# Patient Record
Sex: MALE | Employment: FULL TIME | URBAN - METROPOLITAN AREA
[De-identification: names, ages, dates, MRNs, and addresses within clinical notes are randomized per-mention and may not be internally consistent; named-entity substitution may affect disease eponyms.]

---

## 2017-05-18 ENCOUNTER — HOSPITAL ENCOUNTER (OUTPATIENT)
Dept: PREADMISSION TESTING | Age: 52
Discharge: HOME | End: 2017-05-18

## 2017-05-18 VITALS
HEIGHT: 69 IN | OXYGEN SATURATION: 98 % | WEIGHT: 180 LBS | SYSTOLIC BLOOD PRESSURE: 150 MMHG | TEMPERATURE: 98.3 F | DIASTOLIC BLOOD PRESSURE: 79 MMHG | BODY MASS INDEX: 26.66 KG/M2 | RESPIRATION RATE: 20 BRPM | HEART RATE: 80 BPM

## 2017-05-18 RX ORDER — OMEPRAZOLE 20 MG/1
20 CAPSULE, DELAYED RELEASE ORAL AS NEEDED
COMMUNITY

## 2017-05-18 NOTE — H&P
PAT Pre-Op History & Physical    Patient: Lashon Quintero                  MRN: [id number]          SSN: [id number]  YOB: 1965          Age: 49 y.o. Sex: male                Subjective:   Patient is a 49 y.o.  male who presents with history of chronic left knee pain. tory of left knee arthroscopy twice in past about 10 years ago- second scope turned into open surgery per patient report. States that left knee pain returned about 1 year ago. Rates pain 5/10 and states it is an intermittent dull ache. Also c/o intermittent swelling and stiffness in left knee. States pain and swelling are exacerbated by prolonged walking- his job entails large amount of walking, climbing stairs, etc. Uses compression when pain and swelling become problematic. Does not like to take medications so has not taken NSAIDs. The patient was evaluated in the surgeon's office and it was determined that the most appropriate plan of care is to proceed with surgical intervention. Patient's PCP Dana Mcdaniel MD      Past Medical History:   Diagnosis Date    GERD (gastroesophageal reflux disease)     History of shingles       Past Surgical History:   Procedure Laterality Date    ABDOMEN SURGERY PROC UNLISTED Right     [facility] age 23   [facility] KNEE ARTHROSCOPY Left 2000,2007    2nd scope became open surgery    HX KNEE ARTHROSCOPY Right     age 13    HX ORTHOPAEDIC  06/2015    ACDF C6-7 after a fall    HX VASECTOMY        Prior to Admission medications    Medication Sig Start Date End Date Taking? Authorizing Provider   omeprazole (PRILOSEC) 20 mg capsule Take 20 mg by mouth as needed. Yes Historical Provider   CALCIUM CARBONATE (TUMS PO) Take  by mouth daily as needed. Yes Historical Provider     Current Outpatient Prescriptions   Medication Sig    omeprazole (PRILOSEC) 20 mg capsule Take 20 mg by mouth as needed.  CALCIUM CARBONATE (TUMS PO) Take  by mouth daily as needed.      No current facility-administered medications for this encounter. No Known Allergies   Social History   Substance Use Topics    Smoking status: Former Smoker     Packs/day: 0.50     Years: 23.00     Quit date: 2016    Smokeless tobacco: Never Used    Alcohol use No      History   Drug Use No     Family History   Problem Relation Age of Onset    Other Mother      AAA rupture    Hypertension Mother     Cancer Father      pancreatic, prostate    Alcohol abuse Sister     Thyroid Disease Sister          Review of Systems    Patient denies difficulty swallowing, mouth sores, or loose teeth. Patient denies any recent dental procedures or any planned prior to surgery. Patient denies chest pain, tightness, pain radiating down left arm, palpitations. Denies dizziness, visual disturbances, or lightheadedness. Patient denies shortness of breath, wheezing, cough, fever, or chills. Patient denies diarrhea, constipation, or abdominal pain. Patient denies urinary problems including dysuria, hesitancy, urgency, or incontinence. Denies skin breakdown, rashes, insect bites or open area. Objective:   Patient Vitals for the past 24 hrs:   Temp Pulse Resp BP SpO2   17 1441 98.3 °F (36.8 °C) 80 20 150/79 98 %     Temp (24hrs), Av.3 °F (36.8 °C), Min:98.3 °F (36.8 °C), Max:98.3 °F (36.8 °C)    Body mass index is 26.58 kg/(m^2). Wt Readings from Last 1 Encounters:   17 81.6 kg (180 lb)        Physical Exam:     General: Pleasant,  cooperative, no apparent distress, appears stated age. Eyes: Conjunctivae/corneas clear. EOMs intact. Nose: Nares normal.   Mouth/Throat: Lips, mucosa, and tongue normal. Teeth and gums normal.   Neck: Supple, symmetrical, trachea midline. Back: Symmetric   Lungs: Clear to auscultation bilaterally. Heart: Regular rate and rhythm, S1, S2 normal. No murmur, click, rub or gallop. Abdomen: Soft, non-tender. Bowel sounds normal. No distention.    Musculoskeletal: Unremarkable. Extremities:  Extremities normal, atraumatic, no cyanosis or edema. Calves                                 supple, non tender to palpation. Pulses: 2+ and symmetric bilateral upper extremities. Cap. refill <2 seconds   Skin: Skin color, texture, turgor normal.  No rashes or lesions. Neurologic: CN II-XII grossly intact. Alert and oriented x3. Assessment:     Medial meniscus tear. Plan:     Scheduled for left arthroscopic knee surgery/ partial medial menisectomy. No labs or other pre operative testing indicated by patient history.     Agustin Casas, NP

## 2017-05-18 NOTE — PERIOP NOTES
[facility]  PREOPERATIVE INSTRUCTIONS    Surgery Date:   5/22/17  Surgery arrival time given by surgeon: NO   If no,[facility] [address] staff will call you between 4 PM- 8 PM the day before surgery with your arrival time. If your surgery is on a Monday, we will call you the preceding Friday. Please call [phone] after 8 PM if you did not receive your arrival time. 1. Please report at the designated time to the [address] [facility]. Bring your insurance card, photo identification, and any copayment ( if applicable). 2. You must have a responsible adult to drive you home. You need to have a responsible adult to stay with you the first 24 hours after surgery if you are going home the same day of your surgery and you should not drive a car for 24 hours following your surgery. 3. Nothing to eat or drink after midnight the night before surgery. This includes no water, gum, mints, coffee, juice, etc.  Please note special instructions, if applicable, below for medications. 4. MEDICATIONS TO TAKE THE MORNING OF SURGERY WITH A SIP OF WATER: Prilosec   5. No alcoholic beverages 24 hours before or after your surgery. 6. If you are being admitted to the hospital,please leave personal belongings/luggage in your car until you have an assigned hospital room number. 7. Stop Aspirin and/or any non-steroidal anti-inflammatory drugs (i.e. Ibuprofen, Naproxen, Advil, Aleve) as directed by your surgeon. You may take Tylenol. Stop herbal supplements 1 week prior to  surgery. 8. If you are currently taking Plavix, Coumadin,or any other blood-thinning/anticoagulant medication contact your surgeon for instructions. 9. Please wear comfortable clothes. Wear your glasses instead of contacts. We ask that all money, jewelry and valuables be left at home. Wear no make up, particularly mascara, the day of surgery. 10.  All body piercings, rings,and jewelry need to be removed and left at home. Please wear your hair loose or down. Please no pony-tails, buns, or any metal hair accessories. If you shower the morning of surgery, please do not apply any lotions, powders, or deodorants afterwards. Do not shave any body area within 24 hours of your surgery. 11. Please follow all instructions to avoid any potential surgical cancellation. 12.  Should your physical condition change, (i.e. fever, cold, flu, etc.) please notify your surgeon as soon as possible. 13. It is important to be on time. If a situation occurs where you may be delayed, please call:  [phone] /  on the day of surgery. 14. The [facility] staff can be reached at [phone]. .  15. Special instructions: free  parking    The patient was contacted  in person. He  verbalize  understanding of all instructions does not  need reinforcement.

## 2017-05-19 ENCOUNTER — ANESTHESIA EVENT (OUTPATIENT)
Dept: SURGERY | Age: 52
End: 2017-05-19
Payer: COMMERCIAL

## 2017-05-21 NOTE — DISCHARGE INSTRUCTIONS
Lower Extremity Surgery  Discharge Instructions      Michael Pabon M.D. Please take the time to review the following instructions before you leave the hospital and use them as guidelines during your recovery from surgery. If you have any questions you may contact my office at [phone]. Wound Care / Dressing Changes: You may change your dressing as needed. Beginning two days after you are discharged from the hospital you should change your dressing daily. A big, bulky dressing isn't necessary as long as there is no drainage from the incisions. You can put a band-aid or a piece ofgauze over each incision and wear an ACE bandage as needed for comfort and swelling. Do not to apply antibiotic ointment to your incisions. Sutures will be removed at your one week post-op visit. Wolm Lias / Bathing: You may only shower. You may shower if there is no drainage from your incisions. Your dressing may be removed for showering. You may get your incisions wet in the shower. Don't vigorously scrub the area where your incisions are. Apply a clean, dry dressing after drying off the area of your incisions. Don't take a tub bath, get in a swimming pool or Jacuzzi until the incisions are completely healed. Do not soak your incisions under water. Weight Bearing Status / Braces:   Weight bear as tolerated. Please normalize your activities as quickly as possible. You are able to bend your knee as much as tolerated. We recommend beginning to ride a stationary bike 2 days after surgery. Ice / Elevation:   Continue ice and elevation consistently for 48 hours after surgery. On the 3rd day after surgery, you should ice your knee 3 times per day, for 20 minutes at a time. After one week from surgery, you may use ice and elevation as needed for pain and swelling. Please make sure there is a protective barrier between your skin and the ice.               Diet:   You may advance to your regular diet as tolerated. Increase your clear liquid intake for the next 2 to 3 days. Medication:   1. You will be given a prescriptions for pain medication when you are discharged from the hospital. Please use the medication as prescribed. Pain medications may cause constipation. Please take over the counter stool softeners while you are taking narcotic pain medication. Stool softeners, warm prune juice, and increasing your fluid and fiber intake can help prevent constipation. Do not take laxatives. Other possible side effects of pain medications are dizziness, headache, nausea, vomiting, and urinary retention. Discontinue the pain medication if you develop itching, rash, shortness of breath, or difficulties swallowing. If these symptoms become severe or arent relieved by discontinuing the medication, you should seek immediate medical attention. Refills of pain medication are authorized during office hours only   (8AM - 5PM Monday through Friday)    2. If you were prescribed Percocet/Oxycodone, Norco/Lortab/Vicodin/Hydrocodone or Dilaudid/Hydromorphone you must have a written prescription. These medications cannot be leagally called into the pharmacy. 3. Do not take Tylenol/Acetaminophen in addition to your pain medication, as most pain medications already contain this. Do not exceed 3000mg of Tylenol/Acetaminophen per day. 4. You will also take an antibiotic after surgery as prescribed. 5. You will take an anti-coagulant to prevent a blood clot. You should take Aspirin 325 mg twice/day with meals for 14 days after surgery. If you were prescribed Xarelto 10mg you should take it as prescribed. Do not take Aspirin with Xarelto. 6. You may resume the medications you were taking prior to your surgery. Pain medication may change the effects of any antidepressant medication you may be taking.  If you have any questions about possible interactions between your regular medication and the pain medication, you should consult the physician who prescribes your regular medications. Follow Up Appointment:   Please call [phone] for a follow appointment with Dr. Bia Barrera 7 - 10 days from the time of your surgery. Please let our  know you are scheduling a post-op appointment. Your appointment will likely be with Darby Little PA-C. Franki Pritchett assists Dr. Bia Barrera in surgery and will be able to review your operation and answer your questions. Physical Therapy:   Physical therapy will be discussed with you at your first follow-up appointment with Dr. Bia Barrera. You don't need to begin physical therapy prior to that visit. Signs and Symptoms to be Aware of: If any of the following signs and symptoms occur, you should contact Dr. Bia Barrera' office. Please be advised if a problem arises which you feel requires immediate medical attention or you are unable to contact Dr. Bia Barrera' office you should seek immediate medical attention at the emergency department or other health care facility you have access to.      Signs and symptoms to watch for include:     · A sudden increase in swelling andlor redness or warmth at the area your surgery was performed which isn't relieved by rest, ice and elevation    · Oral temperature greater than 101 degrees for 12 hours or more which isn't relieved by an increase in fluid intake and taking  Tylenol     · Excessive drainage from your incisions, or drainage which hasn't stopped by 72 hours after your surgery despite applying a compressive dressing, ice and elevation     · Calf pain, tenderness, redness or swelling which isn't relieved with rest and elevation     · Fever, chills, shortness ofbreath, chest pain, nausea, vomiting or other signs and symptoms which are of concern to you    Other Instructions:    DISCHARGE SUMMARY from your Nurse    The following personal items collected during your admission are returned to you:   Dental Appliance: Dental Appliances: None  Vision: Visual Aid: None  Hearing Aid:    Jewelry: Jewelry: None  Clothing: Clothing: Footwear, Pants, Shirt, Socks, Undergarments (locker)  Other Valuables: Other Valuables: None  Valuables sent to safe:      PATIENT INSTRUCTIONS:    After general anesthesia or intravenous sedation, for 24 hours or while taking prescription Narcotics:  · Limit your activities  · Do not drive and operate hazardous machinery  · Do not make important personal or business decisions  · Do  not drink alcoholic beverages  · If you have not urinated within 8 hours after discharge, please contact your surgeon on call. Report the following to your surgeon:  · Excessive pain, swelling, redness or odor of or around the surgical area  · Temperature over 100.5  · Nausea and vomiting lasting longer than 4 hours or if unable to take medications  · Any signs of decreased circulation or nerve impairment to extremity: change in color, persistent  numbness, tingling, coldness or increase pain  · Any questions    COUGH AND DEEP BREATHE    Breathing deep and coughing are very important exercises to do after surgery. Deep breathing and coughing open the little air tubes and air sacks in your lungs. You take deep breaths every day. You may not even notice - it is just something you do when you sigh or yawn. It is a natural exercise you do to keep these air passages open. After surgery, take deep breaths and cough, on purpose. Coughing and deep breathing help prevent bronchitis and pneumonia after surgery. If you had chest or belly surgery, use a pillow as a \"hug buddy\" and hold it tightly to your chest or belly when you cough. DIRECTIONS:  6. Take 10 to 15 slow deep breaths every hour while awake. 7. Breathe in deeply, and hold it for 2 seconds. 8. Exhale slowly through puckered lips, like blowing up a balloon.   9. After every 4th or 5th deep breath, hug your pillow to your chest or belly and give a hard, deep cough. Yes, it will probably hurt. But doing this exercise is very important part of healing after surgery. Take your pain medicine to help you do this exercise without too much pain. IF YOU HAVE BEEN DIAGNOSED WITH SLEEP APNEA, PLEASE USE YOUR SLEEIFP APNEA DEVICE OR CPAP MACHINE WHEN YOU INTEND TO NAP AFTER TAKING PAIN MEDICATION. Ankle Pumps    Ankle pumps increase the circulation of oxygenated blood to your lower extremities and decrease your risk for circulation problems such as blood clots. They also stretch the muscles, tendons and ligaments in your foot and ankle, and prevent joint contracture in the ankle and foot, especially after surgeries on the legs. It is important to do ankle pump exercises regularly after surgery because immobility increases your risk for developing a blood clot. Your doctor may also have you take an Aspirin for the next few days as well. If your doctor did not ask you to take an Aspirin, consult with him before starting Aspirin therapy on your own. Slowly point your foot forward, feeling the muscles on the top of your lower leg stretch, and hold this position for 5 seconds. Next, pull your foot back toward you as far as possible, stretching the calf muscles, and hold that position for 5 seconds. Repeat with the other foot. Perform 10 repetitions every hour while awake for both ankles if possible (down and then up with the foot once is one repetition). You should feel gentle stretching of the muscles in your lower leg when doing this exercise. If you feel pain, or your range of motion is limited, don't  Push too hard. Only go the limit your joint and muscles will let you go. If you have increasing pain, progressively worsening leg warmth or swelling, STOP the exercise and call your doctor.      Below is information about the medications your doctor is prescribing after your visit:    Other information in your discharge envelope:  []     PRESCRIPTIONS  []     PHYSICAL THERAPY PRESCRIPTION  []     APPOINTMENT CARDS  []     Regional Anesthesia Pamphlet for block or block with On-Q Catheter from Anesthesia Service  []     Medical device information sheets/pamphlets from their    []     School/work excuse note. []     /parent work excuse note. These are general instructions for a healthy lifestyle:    *  Please give a list of your current medications to your Primary Care Provider. *  Please update this list whenever your medications are discontinued, doses are      changed, or new medications (including over-the-counter products) are added. *  Please carry medication information at all times in case of emergency situations. About Smoking  No smoking / No tobacco products / Avoid exposure to second hand smoke    Surgeon General's Warning:  Quitting smoking now greatly reduces serious risk to your health. Obesity, smoking, and sedentary lifestyle greatly increases your risk for illness and disease. A healthy diet, regular physical exercise & weight monitoring are important for maintaining a healthy lifestyle. Congestive Heart Failure  You may be retaining fluid if you have a history of heart failure or if you experience any of the following symptoms:  Weight gain of 3 pounds or more overnight or 5 pounds in a week, increased swelling in our hands or feet or shortness of breath while lying flat in bed. Please call your doctor as soon as you notice any of these symptoms; do not wait until your next office visit. Recognize signs and symptoms of STROKE:  F - face looks uneven  A - arms unable to move or move even  S - speech slurred or non-existent  T - time-call 911 as soon as signs and symptoms begin-DO NOT go         Back to bed or wait to see if you get better-TIME IS BRAIN. Warning signs of HEART ATTACK  Call 911 if you have these symptoms    · Chest discomfort.   Most heart attacks involve discomfort in the center of the chest that lasts more than a few minutes, or that goes away and comes back. It can feel like uncomfortable pressure, squeezing, fullness, or pain. · Discomfort in other areas of the upper body. Symptoms can include pain or discomfort in one or both        Arms, the back, neck, jaw, or stomach. ·  Shortness of breath with or without chest discomfort. · Other signs may include breaking out in a cold sweat, nausea, or lightheadedness    Don't wait more than five minutes to call 911 - MINUTES MATTER! Fast action can save your life. Calling 911 is almost always the fastest way to get lifesaving treatment. Emergency Medical Services staff can begin treatment when they arrive - up to an hour sooner than if someone gets to the hospital by car. [facility] MEDICATION AND SIDE EFFECT GUIDE    The [facility] [place] MEDICATION AND SIDE EFFECT GUIDE was provided to the PATIENT AND CARE PROVIDER.   Information provided includes instruction about drug purpose and common side effects for the following medications:    · Norco

## 2017-05-22 ENCOUNTER — HOSPITAL ENCOUNTER (OUTPATIENT)
Age: 52
Setting detail: OUTPATIENT SURGERY
Discharge: HOME | End: 2017-05-22
Attending: ORTHOPAEDIC SURGERY | Admitting: ORTHOPAEDIC SURGERY
Payer: COMMERCIAL

## 2017-05-22 ENCOUNTER — ANESTHESIA (OUTPATIENT)
Dept: SURGERY | Age: 52
End: 2017-05-22
Payer: COMMERCIAL

## 2017-05-22 VITALS
HEART RATE: 65 BPM | RESPIRATION RATE: 20 BRPM | DIASTOLIC BLOOD PRESSURE: 68 MMHG | SYSTOLIC BLOOD PRESSURE: 111 MMHG | TEMPERATURE: 97.8 F | OXYGEN SATURATION: 97 %

## 2017-05-22 PROCEDURE — 77030000032 HC CUF TRNQT ZIMM -B: Performed by: ORTHOPAEDIC SURGERY

## 2017-05-22 PROCEDURE — 77030002916 HC SUT ETHLN J&J -A: Performed by: ORTHOPAEDIC SURGERY

## 2017-05-22 PROCEDURE — 76060000032 HC ANESTHESIA 0.5 TO 1 HR: Performed by: ORTHOPAEDIC SURGERY

## 2017-05-22 PROCEDURE — 74011250636 HC RX REV CODE- 250/636: Performed by: ANESTHESIOLOGY

## 2017-05-22 PROCEDURE — 74011250636 HC RX REV CODE- 250/636

## 2017-05-22 PROCEDURE — 74011250637 HC RX REV CODE- 250/637: Performed by: ORTHOPAEDIC SURGERY

## 2017-05-22 PROCEDURE — 77030006877 HC BLD SHV FLL RAD S&N -B: Performed by: ORTHOPAEDIC SURGERY

## 2017-05-22 PROCEDURE — 76010000138 HC OR TIME 0.5 TO 1 HR: Performed by: ORTHOPAEDIC SURGERY

## 2017-05-22 PROCEDURE — 77030008496 HC TBNG ARTHSC IRR S&N -B: Performed by: ORTHOPAEDIC SURGERY

## 2017-05-22 PROCEDURE — 76210000021 HC REC RM PH II 0.5 TO 1 HR: Performed by: ORTHOPAEDIC SURGERY

## 2017-05-22 PROCEDURE — 77030018835 HC SOL IRR LR ICUM -A: Performed by: ORTHOPAEDIC SURGERY

## 2017-05-22 PROCEDURE — 74011250636 HC RX REV CODE- 250/636: Performed by: ORTHOPAEDIC SURGERY

## 2017-05-22 PROCEDURE — 76210000006 HC OR PH I REC 0.5 TO 1 HR: Performed by: ORTHOPAEDIC SURGERY

## 2017-05-22 PROCEDURE — 77030020782 HC GWN BAIR PAWS FLX 3M -B

## 2017-05-22 PROCEDURE — 77030020143 HC AIRWY LARYN INTUB CGAS -A: Performed by: NURSE ANESTHETIST, CERTIFIED REGISTERED

## 2017-05-22 RX ORDER — SODIUM CHLORIDE 0.9 % (FLUSH) 0.9 %
5-10 SYRINGE (ML) INJECTION EVERY 8 HOURS
Status: DISCONTINUED | OUTPATIENT
Start: 2017-05-22 | End: 2017-05-22 | Stop reason: HOSPADM

## 2017-05-22 RX ORDER — SODIUM CHLORIDE 0.9 % (FLUSH) 0.9 %
5-10 SYRINGE (ML) INJECTION AS NEEDED
Status: DISCONTINUED | OUTPATIENT
Start: 2017-05-22 | End: 2017-05-22 | Stop reason: HOSPADM

## 2017-05-22 RX ORDER — HYDROCODONE BITARTRATE AND ACETAMINOPHEN 5; 325 MG/1; MG/1
1 TABLET ORAL
Status: COMPLETED | OUTPATIENT
Start: 2017-05-22 | End: 2017-05-22

## 2017-05-22 RX ORDER — ROPIVACAINE HYDROCHLORIDE 5 MG/ML
INJECTION, SOLUTION EPIDURAL; INFILTRATION; PERINEURAL AS NEEDED
Status: DISCONTINUED | OUTPATIENT
Start: 2017-05-22 | End: 2017-05-22 | Stop reason: HOSPADM

## 2017-05-22 RX ORDER — DIPHENHYDRAMINE HYDROCHLORIDE 50 MG/ML
12.5 INJECTION, SOLUTION INTRAMUSCULAR; INTRAVENOUS AS NEEDED
Status: DISCONTINUED | OUTPATIENT
Start: 2017-05-22 | End: 2017-05-22 | Stop reason: HOSPADM

## 2017-05-22 RX ORDER — ONDANSETRON 2 MG/ML
INJECTION INTRAMUSCULAR; INTRAVENOUS AS NEEDED
Status: DISCONTINUED | OUTPATIENT
Start: 2017-05-22 | End: 2017-05-22 | Stop reason: HOSPADM

## 2017-05-22 RX ORDER — CEFAZOLIN SODIUM IN 0.9 % NACL 2 G/50 ML
2 INTRAVENOUS SOLUTION, PIGGYBACK (ML) INTRAVENOUS ONCE
Status: COMPLETED | OUTPATIENT
Start: 2017-05-22 | End: 2017-05-22

## 2017-05-22 RX ORDER — KETOROLAC TROMETHAMINE 30 MG/ML
INJECTION, SOLUTION INTRAMUSCULAR; INTRAVENOUS AS NEEDED
Status: DISCONTINUED | OUTPATIENT
Start: 2017-05-22 | End: 2017-05-22 | Stop reason: HOSPADM

## 2017-05-22 RX ORDER — MIDAZOLAM HYDROCHLORIDE 1 MG/ML
INJECTION, SOLUTION INTRAMUSCULAR; INTRAVENOUS AS NEEDED
Status: DISCONTINUED | OUTPATIENT
Start: 2017-05-22 | End: 2017-05-22 | Stop reason: HOSPADM

## 2017-05-22 RX ORDER — LIDOCAINE HYDROCHLORIDE 10 MG/ML
0.1 INJECTION, SOLUTION EPIDURAL; INFILTRATION; INTRACAUDAL; PERINEURAL AS NEEDED
Status: DISCONTINUED | OUTPATIENT
Start: 2017-05-22 | End: 2017-05-22 | Stop reason: HOSPADM

## 2017-05-22 RX ORDER — HYDROCODONE BITARTRATE AND ACETAMINOPHEN 5; 325 MG/1; MG/1
1 TABLET ORAL
Qty: 40 TAB | Refills: 0 | Status: SHIPPED | OUTPATIENT
Start: 2017-05-22

## 2017-05-22 RX ORDER — SODIUM CHLORIDE, SODIUM LACTATE, POTASSIUM CHLORIDE, CALCIUM CHLORIDE 600; 310; 30; 20 MG/100ML; MG/100ML; MG/100ML; MG/100ML
100 INJECTION, SOLUTION INTRAVENOUS CONTINUOUS
Status: DISCONTINUED | OUTPATIENT
Start: 2017-05-22 | End: 2017-05-22 | Stop reason: HOSPADM

## 2017-05-22 RX ORDER — PROPOFOL 10 MG/ML
INJECTION, EMULSION INTRAVENOUS AS NEEDED
Status: DISCONTINUED | OUTPATIENT
Start: 2017-05-22 | End: 2017-05-22 | Stop reason: HOSPADM

## 2017-05-22 RX ORDER — ONDANSETRON 2 MG/ML
4 INJECTION INTRAMUSCULAR; INTRAVENOUS AS NEEDED
Status: DISCONTINUED | OUTPATIENT
Start: 2017-05-22 | End: 2017-05-22 | Stop reason: HOSPADM

## 2017-05-22 RX ORDER — FENTANYL CITRATE 50 UG/ML
INJECTION, SOLUTION INTRAMUSCULAR; INTRAVENOUS AS NEEDED
Status: DISCONTINUED | OUTPATIENT
Start: 2017-05-22 | End: 2017-05-22 | Stop reason: HOSPADM

## 2017-05-22 RX ORDER — DEXAMETHASONE SODIUM PHOSPHATE 4 MG/ML
INJECTION, SOLUTION INTRA-ARTICULAR; INTRALESIONAL; INTRAMUSCULAR; INTRAVENOUS; SOFT TISSUE AS NEEDED
Status: DISCONTINUED | OUTPATIENT
Start: 2017-05-22 | End: 2017-05-22 | Stop reason: HOSPADM

## 2017-05-22 RX ORDER — HYDROMORPHONE HYDROCHLORIDE 1 MG/ML
.25-1 INJECTION, SOLUTION INTRAMUSCULAR; INTRAVENOUS; SUBCUTANEOUS
Status: DISCONTINUED | OUTPATIENT
Start: 2017-05-22 | End: 2017-05-22 | Stop reason: HOSPADM

## 2017-05-22 RX ORDER — SODIUM CHLORIDE, SODIUM LACTATE, POTASSIUM CHLORIDE, CALCIUM CHLORIDE 600; 310; 30; 20 MG/100ML; MG/100ML; MG/100ML; MG/100ML
125 INJECTION, SOLUTION INTRAVENOUS CONTINUOUS
Status: DISCONTINUED | OUTPATIENT
Start: 2017-05-22 | End: 2017-05-22 | Stop reason: HOSPADM

## 2017-05-22 RX ADMIN — FENTANYL CITRATE 25 MCG: 50 INJECTION, SOLUTION INTRAMUSCULAR; INTRAVENOUS at 07:51

## 2017-05-22 RX ADMIN — FENTANYL CITRATE 25 MCG: 50 INJECTION, SOLUTION INTRAMUSCULAR; INTRAVENOUS at 07:52

## 2017-05-22 RX ADMIN — CEFAZOLIN 2 G: 1 INJECTION, POWDER, FOR SOLUTION INTRAMUSCULAR; INTRAVENOUS; PARENTERAL at 07:31

## 2017-05-22 RX ADMIN — MIDAZOLAM HYDROCHLORIDE 2 MG: 1 INJECTION, SOLUTION INTRAMUSCULAR; INTRAVENOUS at 07:29

## 2017-05-22 RX ADMIN — ONDANSETRON 4 MG: 2 INJECTION INTRAMUSCULAR; INTRAVENOUS at 08:03

## 2017-05-22 RX ADMIN — FENTANYL CITRATE 25 MCG: 50 INJECTION, SOLUTION INTRAMUSCULAR; INTRAVENOUS at 07:29

## 2017-05-22 RX ADMIN — SODIUM CHLORIDE, SODIUM LACTATE, POTASSIUM CHLORIDE, AND CALCIUM CHLORIDE 100 ML/HR: 600; 310; 30; 20 INJECTION, SOLUTION INTRAVENOUS at 06:41

## 2017-05-22 RX ADMIN — HYDROCODONE BITARTRATE AND ACETAMINOPHEN 1 TABLET: 5; 325 TABLET ORAL at 09:16

## 2017-05-22 RX ADMIN — DEXAMETHASONE SODIUM PHOSPHATE 4 MG: 4 INJECTION, SOLUTION INTRA-ARTICULAR; INTRALESIONAL; INTRAMUSCULAR; INTRAVENOUS; SOFT TISSUE at 07:20

## 2017-05-22 RX ADMIN — FENTANYL CITRATE 25 MCG: 50 INJECTION, SOLUTION INTRAMUSCULAR; INTRAVENOUS at 07:38

## 2017-05-22 RX ADMIN — SODIUM CHLORIDE, SODIUM LACTATE, POTASSIUM CHLORIDE, AND CALCIUM CHLORIDE: 600; 310; 30; 20 INJECTION, SOLUTION INTRAVENOUS at 08:11

## 2017-05-22 RX ADMIN — KETOROLAC TROMETHAMINE 30 MG: 30 INJECTION, SOLUTION INTRAMUSCULAR; INTRAVENOUS at 08:03

## 2017-05-22 RX ADMIN — PROPOFOL 200 MG: 10 INJECTION, EMULSION INTRAVENOUS at 07:35

## 2017-05-22 NOTE — H&P (VIEW-ONLY)
PAT Pre-Op History & Physical    Patient: Alda Monreal                  MRN: [id number]          SSN: [id number]  YOB: 1965          Age: 49 y.o. Sex: male                Subjective:   Patient is a 49 y.o.  male who presents with history of chronic left knee pain. tory of left knee arthroscopy twice in past about 10 years ago- second scope turned into open surgery per patient report. States that left knee pain returned about 1 year ago. Rates pain 5/10 and states it is an intermittent dull ache. Also c/o intermittent swelling and stiffness in left knee. States pain and swelling are exacerbated by prolonged walking- his job entails large amount of walking, climbing stairs, etc. Uses compression when pain and swelling become problematic. Does not like to take medications so has not taken NSAIDs. The patient was evaluated in the surgeon's office and it was determined that the most appropriate plan of care is to proceed with surgical intervention. Patient's PCP Patsy Washington MD      Past Medical History:   Diagnosis Date    GERD (gastroesophageal reflux disease)     History of shingles       Past Surgical History:   Procedure Laterality Date    ABDOMEN SURGERY PROC UNLISTED Right     [facility] age 23   [facility] KNEE ARTHROSCOPY Left 2000,2007    2nd scope became open surgery    HX KNEE ARTHROSCOPY Right     age 13    HX ORTHOPAEDIC  06/2015    ACDF C6-7 after a fall    HX VASECTOMY        Prior to Admission medications    Medication Sig Start Date End Date Taking? Authorizing Provider   omeprazole (PRILOSEC) 20 mg capsule Take 20 mg by mouth as needed. Yes Historical Provider   CALCIUM CARBONATE (TUMS PO) Take  by mouth daily as needed. Yes Historical Provider     Current Outpatient Prescriptions   Medication Sig    omeprazole (PRILOSEC) 20 mg capsule Take 20 mg by mouth as needed.  CALCIUM CARBONATE (TUMS PO) Take  by mouth daily as needed.      No current facility-administered medications for this encounter. No Known Allergies   Social History   Substance Use Topics    Smoking status: Former Smoker     Packs/day: 0.50     Years: 23.00     Quit date: 2016    Smokeless tobacco: Never Used    Alcohol use No      History   Drug Use No     Family History   Problem Relation Age of Onset    Other Mother      AAA rupture    Hypertension Mother     Cancer Father      pancreatic, prostate    Alcohol abuse Sister     Thyroid Disease Sister          Review of Systems    Patient denies difficulty swallowing, mouth sores, or loose teeth. Patient denies any recent dental procedures or any planned prior to surgery. Patient denies chest pain, tightness, pain radiating down left arm, palpitations. Denies dizziness, visual disturbances, or lightheadedness. Patient denies shortness of breath, wheezing, cough, fever, or chills. Patient denies diarrhea, constipation, or abdominal pain. Patient denies urinary problems including dysuria, hesitancy, urgency, or incontinence. Denies skin breakdown, rashes, insect bites or open area. Objective:   Patient Vitals for the past 24 hrs:   Temp Pulse Resp BP SpO2   17 1441 98.3 °F (36.8 °C) 80 20 150/79 98 %     Temp (24hrs), Av.3 °F (36.8 °C), Min:98.3 °F (36.8 °C), Max:98.3 °F (36.8 °C)    Body mass index is 26.58 kg/(m^2). Wt Readings from Last 1 Encounters:   17 81.6 kg (180 lb)        Physical Exam:     General: Pleasant,  cooperative, no apparent distress, appears stated age. Eyes: Conjunctivae/corneas clear. EOMs intact. Nose: Nares normal.   Mouth/Throat: Lips, mucosa, and tongue normal. Teeth and gums normal.   Neck: Supple, symmetrical, trachea midline. Back: Symmetric   Lungs: Clear to auscultation bilaterally. Heart: Regular rate and rhythm, S1, S2 normal. No murmur, click, rub or gallop. Abdomen: Soft, non-tender. Bowel sounds normal. No distention.    Musculoskeletal: Unremarkable. Extremities:  Extremities normal, atraumatic, no cyanosis or edema. Calves                                 supple, non tender to palpation. Pulses: 2+ and symmetric bilateral upper extremities. Cap. refill <2 seconds   Skin: Skin color, texture, turgor normal.  No rashes or lesions. Neurologic: CN II-XII grossly intact. Alert and oriented x3. Assessment:     Medial meniscus tear. Plan:     Scheduled for left arthroscopic knee surgery/ partial medial menisectomy. No labs or other pre operative testing indicated by patient history.     Agustin Casas, NP

## 2017-05-22 NOTE — IP AVS SNAPSHOT
[address] 
[phone] Patient: Prudence Young MRN: [id number] DPC:0/7/2064 You are allergic to the following No active allergies Recent Documentation Smoking Status Former Smoker Emergency Contacts Name Discharge Info Relation Home Work Mobile [facility] CAREGIVER [3] Spouse [3]   [phone] About your hospitalization You were admitted on:  May 22, 2017 You last received care in the:  [facility] PACU You were discharged on:  May 22, 2017 Unit phone number:  [phone] Why you were hospitalized Your primary diagnosis was:  Not on File Providers Seen During Your Hospitalizations Provider Role Specialty Primary office phone Rody Strauss MD Attending Provider Orthopedic Surgery [phone] Your Primary Care Physician (PCP) Primary Care Physician Office Phone Office Fax Meri Estiven [phone] Follow-up Information Follow up With Details Comments Contact Info Carlito Angel MD   [address] 
[phone] Current Discharge Medication List  
  
START taking these medications Dose & Instructions Dispensing Information Comments Morning Noon Evening Bedtime HYDROcodone-acetaminophen 5-325 mg per tablet Commonly known as:  Jerl Ards Your last dose was: Your next dose is:    
   
   
 Dose:  1 Tab Take 1 Tab by mouth every four (4) hours as needed for Pain. Max Daily Amount: 6 Tabs. Quantity:  40 Tab Refills:  0 CONTINUE these medications which have NOT CHANGED Dose & Instructions Dispensing Information Comments Morning Noon Evening Bedtime PriLOSEC 20 mg capsule Generic drug:  omeprazole Your last dose was: Your next dose is: Dose:  20 mg Take 20 mg by mouth as needed. Refills:  0  
     
   
   
   
  
 TUMS PO Your last dose was: Your next dose is: Take  by mouth daily as needed. Refills:  0 Where to Get Your Medications Information on where to get these meds will be given to you by the nurse or doctor. ! Ask your nurse or doctor about these medications HYDROcodone-acetaminophen 5-325 mg per tablet Discharge Instructions Lower Extremity Surgery Discharge Instructions Theodore Nath M.D. Please take the time to review the following instructions before you leave the hospital and use them as guidelines during your recovery from surgery. If you have any questions you may contact my office at [phone]. Wound Care / Dressing Changes: You may change your dressing as needed. Beginning two days after you are discharged from the hospital you should change your dressing daily. A big, bulky dressing isn't necessary as long as there is no drainage from the incisions. You can put a band-aid or a piece ofgauze over each incision and wear an ACE bandage as needed for comfort and swelling. Do not to apply antibiotic ointment to your incisions. Sutures will be removed at your one week post-op visit. Wilfrid Rayo / Bathing: You may only shower. You may shower if there is no drainage from your incisions. Your dressing may be removed for showering. You may get your incisions wet in the shower. Don't vigorously scrub the area where your incisions are. Apply a clean, dry dressing after drying off the area of your incisions. Don't take a tub bath, get in a swimming pool or Jacuzzi until the incisions are completely healed. Do not soak your incisions under water. Weight Bearing Status / Braces:  
Weight bear as tolerated.   Please normalize your activities as quickly as possible. You are able to bend your knee as much as tolerated. We recommend beginning to ride a stationary bike 2 days after surgery. Ice / Elevation:  
Continue ice and elevation consistently for 48 hours after surgery. On the 3rd day after surgery, you should ice your knee 3 times per day, for 20 minutes at a time. After one week from surgery, you may use ice and elevation as needed for pain and swelling. Please make sure there is a protective barrier between your skin and the ice. Diet:  
You may advance to your regular diet as tolerated. Increase your clear liquid intake for the next 2 to 3 days. Medication:  
1. You will be given a prescriptions for pain medication when you are discharged from the hospital. Please use the medication as prescribed. Pain medications may cause constipation. Please take over the counter stool softeners while you are taking narcotic pain medication. Stool softeners, warm prune juice, and increasing your fluid and fiber intake can help prevent constipation. Do not take laxatives. Other possible side effects of pain medications are dizziness, headache, nausea, vomiting, and urinary retention. Discontinue the pain medication if you develop itching, rash, shortness of breath, or difficulties swallowing. If these symptoms become severe or arent relieved by discontinuing the medication, you should seek immediate medical attention. Refills of pain medication are authorized during office hours only (8AM  5PM Monday through Friday) 2. If you were prescribed Percocet/Oxycodone, Norco/Lortab/Vicodin/Hydrocodone or Dilaudid/Hydromorphone you must have a written prescription. These medications cannot be leagally called into the pharmacy. 3. Do not take Tylenol/Acetaminophen in addition to your pain medication, as most pain medications already contain this. Do not exceed 3000mg of Tylenol/Acetaminophen per day. 4. You will also take an antibiotic after surgery as prescribed. 5. You will take an anti-coagulant to prevent a blood clot. You should take Aspirin 325 mg twice/day with meals for 14 days after surgery. If you were prescribed Xarelto 10mg you should take it as prescribed. Do not take Aspirin with Xarelto. 6. You may resume the medications you were taking prior to your surgery. Pain medication may change the effects of any antidepressant medication you may be taking. If you have any questions about possible interactions between your regular medication and the pain medication, you should consult the physician who prescribes your regular medications. Follow Up Appointment:  
Please call [phone] for a follow appointment with Dr. La Miles 7 - 10 days from the time of your surgery. Please let our  know you are scheduling a post-op appointment. Your appointment will likely be with EDDIE Christensen assists Dr. La Miles in surgery and will be able to review your operation and answer your questions. Physical Therapy:  
Physical therapy will be discussed with you at your first follow-up appointment with Dr. La Miles. You don't need to begin physical therapy prior to that visit. Signs and Symptoms to be Aware of: If any of the following signs and symptoms occur, you should contact Dr. La Miles' office. Please be advised if a problem arises which you feel requires immediate medical attention or you are unable to contact Dr. La Miles' office you should seek immediate medical attention at the emergency department or other health care facility you have access to. Signs and symptoms to watch for include: · A sudden increase in swelling andlor redness or warmth at the area your surgery was performed which isn't relieved by rest, ice and elevation · Oral temperature greater than 101 degrees for 12 hours or more which isn't relieved by an increase in fluid intake and taking  Tylenol · Excessive drainage from your incisions, or drainage which hasn't stopped by 72 hours after your surgery despite applying a compressive dressing, ice and elevation · Calf pain, tenderness, redness or swelling which isn't relieved with rest and elevation · Fever, chills, shortness ofbreath, chest pain, nausea, vomiting or other signs and symptoms which are of concern to you Other Instructions: 
 
DISCHARGE SUMMARY from your Nurse The following personal items collected during your admission are returned to you:  
Dental Appliance: Dental Appliances: None Vision: Visual Aid: None Hearing Aid:   
Jewelry: Jewelry: None Clothing: Clothing: Footwear, Pants, Shirt, Socks, Undergarments (locker) Other Valuables: Other Valuables: None Valuables sent to safe:   
 
PATIENT INSTRUCTIONS: 
 
After general anesthesia or intravenous sedation, for 24 hours or while taking prescription Narcotics: · Limit your activities · Do not drive and operate hazardous machinery · Do not make important personal or business decisions · Do  not drink alcoholic beverages · If you have not urinated within 8 hours after discharge, please contact your surgeon on call. Report the following to your surgeon: 
· Excessive pain, swelling, redness or odor of or around the surgical area · Temperature over 100.5 · Nausea and vomiting lasting longer than 4 hours or if unable to take medications · Any signs of decreased circulation or nerve impairment to extremity: change in color, persistent  numbness, tingling, coldness or increase pain · Any questions [address] Breathing deep and coughing are very important exercises to do after surgery. Deep breathing and coughing open the little air tubes and air sacks in your lungs. You take deep breaths every day.   You may not even notice - it is just something you do when you sigh or yawn. It is a natural exercise you do to keep these air passages open. After surgery, take deep breaths and cough, on purpose. Coughing and deep breathing help prevent bronchitis and pneumonia after surgery. If you had chest or belly surgery, use a pillow as a \"hug tung\" and hold it tightly to your chest or belly when you cough. DIRECTIONS: 
6. Take 10 to 15 slow deep breaths every hour while awake. 7. Breathe in deeply, and hold it for 2 seconds. 8. Exhale slowly through puckered lips, like blowing up a balloon. 9. After every 4th or 5th deep breath, hug your pillow to your chest or belly and give a hard, deep cough. Yes, it will probably hurt. But doing this exercise is very important part of healing after surgery. Take your pain medicine to help you do this exercise without too much pain. IF YOU HAVE BEEN DIAGNOSED WITH SLEEP APNEA, PLEASE USE YOUR SLEEIFP APNEA DEVICE OR CPAP MACHINE WHEN YOU INTEND TO NAP AFTER TAKING PAIN MEDICATION. Ankle Pumps Ankle pumps increase the circulation of oxygenated blood to your lower extremities and decrease your risk for circulation problems such as blood clots. They also stretch the muscles, tendons and ligaments in your foot and ankle, and prevent joint contracture in the ankle and foot, especially after surgeries on the legs. It is important to do ankle pump exercises regularly after surgery because immobility increases your risk for developing a blood clot. Your doctor may also have you take an Aspirin for the next few days as well. If your doctor did not ask you to take an Aspirin, consult with him before starting Aspirin therapy on your own. Slowly point your foot forward, feeling the muscles on the top of your lower leg stretch, and hold this position for 5 seconds.   
 
          
 
Next, pull your foot back toward you as far as possible, stretching the calf muscles, and hold that position for 5 seconds. Repeat with the other foot. Perform 10 repetitions every hour while awake for both ankles if possible (down and then up with the foot once is one repetition). You should feel gentle stretching of the muscles in your lower leg when doing this exercise. If you feel pain, or your range of motion is limited, don't  Push too hard. Only go the limit your joint and muscles will let you go. If you have increasing pain, progressively worsening leg warmth or swelling, STOP the exercise and call your doctor. Below is information about the medications your doctor is prescribing after your visit: 
 
 
 
 
 
 
Discharge Orders None Introducing [facility] SERVICES!    
 Gabriela Guerra introduces [employer] patient portal. Now you can access parts of your medical record, email your doctor's office, and request medication refills online. 1. In your internet browser, go to [email or web address] 2. Click on the First Time User? Click Here link in the Sign In box. You will see the New Member Sign Up page. 3. Enter your [employer] Access Code exactly as it appears below. You will not need to use this code after youve completed the sign-up process. If you do not sign up before the expiration date, you must request a new code. · [employer] Access Code: [email or web address] Expires: 8/16/2017  2:14 PM 
 
4. Enter the last four digits of your Social Security Number (xxxx) and Date of Birth (mm/dd/yyyy) as indicated and click Submit. You will be taken to the next sign-up page. 5. Create a [employer] ID. This will be your [employer] login ID and cannot be changed, so think of one that is secure and easy to remember. 6. Create a [employer] password. You can change your password at any time. 7. Enter your Password Reset Question and Answer. This can be used at a later time if you forget your password. 8. Enter your e-mail address. You will receive e-mail notification when new information is available in [address] 9. Click Sign Up. You can now view and download portions of your medical record. 10. Click the Download Summary menu link to download a portable copy of your medical information. If you have questions, please visit the Frequently Asked Questions section of the [employer] website. Remember, [employer] is NOT to be used for urgent needs. For medical emergencies, dial 911. Now available from your iPhone and Android! General Information Please provide this summary of care documentation to your next provider. Patient Signature:  ____________________________________________________________ Date:  ____________________________________________________________  
  
Theone Jones  Provider Signature: ____________________________________________________________ Date:  ____________________________________________________________

## 2017-05-22 NOTE — IP AVS SNAPSHOT
Current Discharge Medication List  
  
START taking these medications Dose & Instructions Dispensing Information Comments Morning Noon Evening Bedtime HYDROcodone-acetaminophen 5-325 mg per tablet Commonly known as:  Lynnda Levo Your last dose was: Your next dose is:    
   
   
 Dose:  1 Tab Take 1 Tab by mouth every four (4) hours as needed for Pain. Max Daily Amount: 6 Tabs. Quantity:  40 Tab Refills:  0 CONTINUE these medications which have NOT CHANGED Dose & Instructions Dispensing Information Comments Morning Noon Evening Bedtime PriLOSEC 20 mg capsule Generic drug:  omeprazole Your last dose was: Your next dose is:    
   
   
 Dose:  20 mg Take 20 mg by mouth as needed. Refills:  0  
     
   
   
   
  
 TUMS PO Your last dose was: Your next dose is: Take  by mouth daily as needed. Refills:  0 Where to Get Your Medications Information on where to get these meds will be given to you by the nurse or doctor. ! Ask your nurse or doctor about these medications HYDROcodone-acetaminophen 5-325 mg per tablet

## 2017-05-22 NOTE — ANESTHESIA PREPROCEDURE EVALUATION
Anesthetic History   No history of anesthetic complications            Review of Systems / Medical History  Patient summary reviewed, nursing notes reviewed and pertinent labs reviewed    Pulmonary  Within defined limits                 Neuro/Psych   Within defined limits           Cardiovascular  Within defined limits                Exercise tolerance: >4 METS     GI/Hepatic/Renal  Within defined limits   GERD           Endo/Other  Within defined limits           Other Findings              Physical Exam    Airway  Mallampati: II    Neck ROM: normal range of motion   Mouth opening: Normal     Cardiovascular  Regular rate and rhythm,  S1 and S2 normal,  no murmur, click, rub, or gallop  Rhythm: regular  Rate: normal         Dental  No notable dental hx       Pulmonary  Breath sounds clear to auscultation               Abdominal  GI exam deferred       Other Findings            Anesthetic Plan    ASA: 2  Anesthesia type: general          Induction: Intravenous  Anesthetic plan and risks discussed with: Patient

## 2017-05-22 NOTE — BRIEF OP NOTE
BRIEF OPERATIVE NOTE    Date of Procedure: 5/22/2017   Preoperative Diagnosis: MEDIAL MENISCUS TEAR  Postoperative Diagnosis: MEDIAL MENISCUS TEAR    Procedure: Procedure(s):  LEFT KNEE ARTHROSCOPY, PARTIAL MEDIAL MENISCECTOMY    Surgeon: Anders Cooper MD  Assistant(s): Lewis Allen PA-C ATC  Anesthesia: General   Estimated Blood Loss: minimal  Specimens: * No specimens in log *   Findings: mmt  Complications: None  Implants: * No implants in log *

## 2017-05-22 NOTE — INTERVAL H&P NOTE
H&P Update:  Leslee Gonzalez was seen and examined. History and physical has been reviewed. The patient has been examined.  There have been no significant clinical changes since the completion of the originally dated History and Physical.    Signed By: Ivanna Espinoza MD     May 22, 2017 7:10 AM

## 2017-05-22 NOTE — ANESTHESIA POSTPROCEDURE EVALUATION
Post-Anesthesia Evaluation and Assessment    Patient: Nora Norton MRN: [id number]  SSN: [id number]    YOB: 1965  Age: 49 y.o. Sex: male       Cardiovascular Function/Vital Signs  Visit Vitals    /68    Pulse 69    Temp 36.6 °C (97.8 °F)    Resp 19    SpO2 97%       Patient is status post general anesthesia for Procedure(s):  LEFT KNEE ARTHROSCOPY, PARTIAL MEDIAL MENISCECTOMY. Nausea/Vomiting: None    Postoperative hydration reviewed and adequate. Pain:  Pain Scale 1: Numeric (0 - 10) (05/22/17 0845)  Pain Intensity 1: 1 (05/22/17 0845)   Managed    Neurological Status:   Neuro (WDL): Exceptions to WDL (05/22/17 0845)  Neuro  Neurologic State: Eyes open spontaneously (05/22/17 0845)  Orientation Level: Oriented to person;Oriented to place;Oriented to situation (05/22/17 0845)  Cognition: Follows commands (05/22/17 0845)  LUE Motor Response: Moderate (05/22/17 0845)  LLE Motor Response: Moderate (05/22/17 0845)  RUE Motor Response: Moderate (05/22/17 0845)  RLE Motor Response: Moderate (05/22/17 0845)   At baseline    Mental Status and Level of Consciousness: Arousable    Pulmonary Status:   O2 Device: Room air (05/22/17 0845)   Adequate oxygenation and airway patent    Complications related to anesthesia: None    Post-anesthesia assessment completed.  No concerns    Signed By: Iliana Bahena MD     May 22, 2017

## 2017-05-22 NOTE — OP NOTES
KNEE ARTHROSCOPY OP NOTE        OPERATIVE REPORT    Patient: Zoey Roldan CSN: [id number] SSN: [id number]    YOB: 1965  Age: 49 y.o. Sex: male      Admit Date: 5/22/2017  Admit Diagnosis: MEDIAL MENISCUS TEAR    Date of Procedure: 5/22/2017   Preoperative Diagnosis: MEDIAL MENISCUS TEAR  Postoperative Diagnosis: MEDIAL MENISCUS TEAR    Procedure: Procedure(s):  LEFT KNEE ARTHROSCOPY, PARTIAL MEDIAL MENISCECTOMY  Surgeon: Anastacia Rao MD  Assistant(s):   Anesthesia: General   Estimated Blood Loss: 5cc  Specimens: * No specimens in log *   Complications: None  Implants: * No implants in log *      INDICATIONS: @ is a patient of mine who has had a long history of  knee pain. Zoey Roldan has failed conservative treatment and presents for definitive operative care. DESCRIPTION OF PROCEDURE:  After being informed of the risks and benefits of the procedure, which include, but are not limited to bleeding, infection, neurovascular damage, wound complications, pain and stiffness in the knee, and DVT, the patient consented for the procedure. After adequate general anesthesia, the involved knee was prepped and draped in a sterile fashion. Standard arthroscopic portals were established and the knee joint was serially inspected. The medial joint line was initially evaluated. The medial femoral condyle revealed grade 3 changes. The medial tibial plateau revealed grade 2 changes. The medial meniscus revealed a bucket-handle tearinvolving the majority of the medial meniscus. The ACL and PCL were visualized, probed, and felt to be intact. The lateral joint line was next evaluated. The lateral femoral condyle revealed no significant abnormalities. The lateral tibial plateau revealed no significant abnormalities. The lateral meniscus revealed no significant abnormalities. The medial and lateral gutters were evaluated andNo loose bodies were noted. . The scope was then placed in the patellofemoral joint. The medial facet, central ridge, lateral facet, and trochlear groove were visualized for articular cartilage damage. The medial facet revealed Grade 3 changes. The central ridge revealed Grade 2 changes. The lateral facet revealed Grade 3 changes. The trochlear groove revealed Grade 3 changes. All articular cartilage lesions and meniscal tears were debrided appropriately to stable edges. Care was taken to be sure that the borders of articular cartilage injuries were well-shouldered. In addition, the meniscal tears were debrided appropriately to a stable edge. As much intact meniscus was preserved as possible. Once all the debridement was completed through the lateral portal, the scope was placed in the medial portal where, again, all surfaces were evaluated and felt to have been appropriately debrided. The knee was then irrigated and the scope was removed. The wounds were closed. Sterile dressings were applied and the patient was awakened and taken to the recovery room in stable condition. There were no complications.

## 2018-12-14 ENCOUNTER — APPOINTMENT (OUTPATIENT)
Dept: CT IMAGING | Age: 53
DRG: 948 | End: 2018-12-14
Attending: EMERGENCY MEDICINE
Payer: COMMERCIAL

## 2018-12-14 ENCOUNTER — APPOINTMENT (OUTPATIENT)
Dept: GENERAL RADIOLOGY | Age: 53
DRG: 948 | End: 2018-12-14
Attending: EMERGENCY MEDICINE
Payer: COMMERCIAL

## 2018-12-14 ENCOUNTER — HOSPITAL ENCOUNTER (INPATIENT)
Age: 53
LOS: 1 days | Discharge: HOME | DRG: 948 | End: 2018-12-16
Attending: EMERGENCY MEDICINE | Admitting: FAMILY MEDICINE
Payer: COMMERCIAL

## 2018-12-14 DIAGNOSIS — R53.1 WEAKNESS: ICD-10-CM

## 2018-12-14 DIAGNOSIS — R53.1 LEFT-SIDED WEAKNESS: ICD-10-CM

## 2018-12-14 DIAGNOSIS — R27.0 ATAXIA: Primary | ICD-10-CM

## 2018-12-14 LAB
ANION GAP SERPL CALC-SCNC: 10 MMOL/L (ref 5–15)
APPEARANCE UR: CLEAR
BACTERIA URNS QL MICRO: NEGATIVE /HPF
BASOPHILS # BLD: 0.1 K/UL (ref 0–0.1)
BASOPHILS NFR BLD: 1 % (ref 0–1)
BILIRUB UR QL: NEGATIVE
BUN SERPL-MCNC: 18 MG/DL (ref 6–20)
BUN/CREAT SERPL: 13 (ref 12–20)
CALCIUM SERPL-MCNC: 9.7 MG/DL (ref 8.5–10.1)
CHLORIDE SERPL-SCNC: 102 MMOL/L (ref 97–108)
CO2 SERPL-SCNC: 27 MMOL/L (ref 21–32)
COLOR UR: NORMAL
COMMENT, HOLDF: NORMAL
CREAT SERPL-MCNC: 1.34 MG/DL (ref 0.7–1.3)
DIFFERENTIAL METHOD BLD: NORMAL
EOSINOPHIL # BLD: 0.2 K/UL (ref 0–0.4)
EOSINOPHIL NFR BLD: 3 % (ref 0–7)
EPITH CASTS URNS QL MICRO: NORMAL /LPF
ERYTHROCYTE [DISTWIDTH] IN BLOOD BY AUTOMATED COUNT: 12.4 % (ref 11.5–14.5)
GLUCOSE BLD STRIP.AUTO-MCNC: 96 MG/DL (ref 65–100)
GLUCOSE SERPL-MCNC: 105 MG/DL (ref 65–100)
GLUCOSE UR STRIP.AUTO-MCNC: NEGATIVE MG/DL
HCT VFR BLD AUTO: 45.3 % (ref 36.6–50.3)
HGB BLD-MCNC: 15.2 G/DL (ref 12.1–17)
HGB UR QL STRIP: NEGATIVE
KETONES UR QL STRIP.AUTO: NEGATIVE MG/DL
LEUKOCYTE ESTERASE UR QL STRIP.AUTO: NEGATIVE
LYMPHOCYTES # BLD: 3.1 K/UL
LYMPHOCYTES NFR BLD: 34 % (ref 12–49)
MCH RBC QN AUTO: 29.8 PG (ref 26–34)
MCHC RBC AUTO-ENTMCNC: 33.6 G/DL (ref 30–36.5)
MCV RBC AUTO: 88.8 FL (ref 80–99)
MONOCYTES # BLD: 1 K/UL (ref 0–1)
MONOCYTES NFR BLD: 11 % (ref 5–13)
NEUTS SEG # BLD: 4.7 K/UL (ref 1.8–8)
NEUTS SEG NFR BLD: 51 % (ref 32–75)
NITRITE UR QL STRIP.AUTO: NEGATIVE
PH UR STRIP: 6 [PH] (ref 5–8)
PLATELET # BLD AUTO: 280 K/UL (ref 150–400)
PMV BLD AUTO: 10.9 FL (ref 8.9–12.9)
POTASSIUM SERPL-SCNC: 4 MMOL/L (ref 3.5–5.1)
PROT UR STRIP-MCNC: NEGATIVE MG/DL
RBC # BLD AUTO: 5.1 M/UL (ref 4.1–5.7)
RBC #/AREA URNS HPF: NORMAL /HPF (ref 0–5)
SAMPLES BEING HELD,HOLD: NORMAL
SERVICE CMNT-IMP: NORMAL
SODIUM SERPL-SCNC: 139 MMOL/L (ref 136–145)
SP GR UR REFRACTOMETRY: 1.01 (ref 1–1.03)
TROPONIN I SERPL-MCNC: <0.05 NG/ML
UR CULT HOLD, URHOLD: NORMAL
UROBILINOGEN UR QL STRIP.AUTO: 0.2 EU/DL (ref 0.2–1)
WBC # BLD AUTO: 9.1 K/UL (ref 4.1–11.1)
WBC URNS QL MICRO: NORMAL /HPF (ref 0–4)
XXWBCSUS: 0

## 2018-12-14 PROCEDURE — 74011250637 HC RX REV CODE- 250/637: Performed by: EMERGENCY MEDICINE

## 2018-12-14 PROCEDURE — 84484 ASSAY OF TROPONIN QUANT: CPT

## 2018-12-14 PROCEDURE — 80048 BASIC METABOLIC PNL TOTAL CA: CPT

## 2018-12-14 PROCEDURE — 99218 HC RM OBSERVATION: CPT

## 2018-12-14 PROCEDURE — 81001 URINALYSIS AUTO W/SCOPE: CPT

## 2018-12-14 PROCEDURE — 71045 X-RAY EXAM CHEST 1 VIEW: CPT

## 2018-12-14 PROCEDURE — 94762 N-INVAS EAR/PLS OXIMTRY CONT: CPT

## 2018-12-14 PROCEDURE — 36415 COLL VENOUS BLD VENIPUNCTURE: CPT

## 2018-12-14 PROCEDURE — 93005 ELECTROCARDIOGRAM TRACING: CPT

## 2018-12-14 PROCEDURE — 70450 CT HEAD/BRAIN W/O DYE: CPT

## 2018-12-14 PROCEDURE — 99285 EMERGENCY DEPT VISIT HI MDM: CPT

## 2018-12-14 PROCEDURE — 85025 COMPLETE CBC W/AUTO DIFF WBC: CPT

## 2018-12-14 PROCEDURE — 74011250636 HC RX REV CODE- 250/636: Performed by: INTERNAL MEDICINE

## 2018-12-14 PROCEDURE — 82962 GLUCOSE BLOOD TEST: CPT

## 2018-12-14 RX ORDER — ONDANSETRON 2 MG/ML
4 INJECTION INTRAMUSCULAR; INTRAVENOUS
Status: DISCONTINUED | OUTPATIENT
Start: 2018-12-14 | End: 2018-12-16 | Stop reason: HOSPADM

## 2018-12-14 RX ORDER — SODIUM CHLORIDE 9 MG/ML
75 INJECTION, SOLUTION INTRAVENOUS CONTINUOUS
Status: DISCONTINUED | OUTPATIENT
Start: 2018-12-14 | End: 2018-12-16

## 2018-12-14 RX ORDER — ASPIRIN 325 MG
325 TABLET ORAL DAILY
Status: DISCONTINUED | OUTPATIENT
Start: 2018-12-15 | End: 2018-12-16 | Stop reason: HOSPADM

## 2018-12-14 RX ORDER — GUAIFENESIN 100 MG/5ML
324 LIQUID (ML) ORAL
Status: COMPLETED | OUTPATIENT
Start: 2018-12-14 | End: 2018-12-14

## 2018-12-14 RX ORDER — SODIUM CHLORIDE 0.9 % (FLUSH) 0.9 %
5-10 SYRINGE (ML) INJECTION AS NEEDED
Status: DISCONTINUED | OUTPATIENT
Start: 2018-12-14 | End: 2018-12-16 | Stop reason: HOSPADM

## 2018-12-14 RX ORDER — CLOPIDOGREL BISULFATE 75 MG/1
75 TABLET ORAL
Status: COMPLETED | OUTPATIENT
Start: 2018-12-14 | End: 2018-12-14

## 2018-12-14 RX ORDER — ACETAMINOPHEN 325 MG/1
650 TABLET ORAL
Status: DISCONTINUED | OUTPATIENT
Start: 2018-12-14 | End: 2018-12-16 | Stop reason: HOSPADM

## 2018-12-14 RX ORDER — SODIUM CHLORIDE 0.9 % (FLUSH) 0.9 %
5-10 SYRINGE (ML) INJECTION EVERY 8 HOURS
Status: DISCONTINUED | OUTPATIENT
Start: 2018-12-14 | End: 2018-12-16 | Stop reason: HOSPADM

## 2018-12-14 RX ORDER — ENOXAPARIN SODIUM 100 MG/ML
40 INJECTION SUBCUTANEOUS EVERY 24 HOURS
Status: DISCONTINUED | OUTPATIENT
Start: 2018-12-14 | End: 2018-12-16 | Stop reason: HOSPADM

## 2018-12-14 RX ORDER — ACETAMINOPHEN 650 MG/1
650 SUPPOSITORY RECTAL
Status: DISCONTINUED | OUTPATIENT
Start: 2018-12-14 | End: 2018-12-16 | Stop reason: HOSPADM

## 2018-12-14 RX ADMIN — CLOPIDOGREL BISULFATE 75 MG: 75 TABLET ORAL at 18:55

## 2018-12-14 RX ADMIN — ASPIRIN 81 MG 324 MG: 81 TABLET ORAL at 18:00

## 2018-12-14 RX ADMIN — SODIUM CHLORIDE 75 ML/HR: 900 INJECTION, SOLUTION INTRAVENOUS at 23:47

## 2018-12-14 RX ADMIN — Medication 10 ML: at 23:47

## 2018-12-14 NOTE — ED TRIAGE NOTES
Patient presents to treatment area via wheelchair. Patient states that at about 1500 today, patient began feeling left sided weakness and dizziness. Patient has mild ataxia in left leg.

## 2018-12-14 NOTE — ED PROVIDER NOTES
45-year-old male with a history of GERD, cervical fracture presents with left-sided weakness that started at 3 PM. He was working on a job site when he felt sudden onset of left-sided weakness and felt like his left leg was going to buckle. He also complained of left upper extremity paresthesias. He denied any headache. Denied any problem speaking. He was having a hard time walking. He denies ever having these symptoms before. He has not had any chest pain or shortness of breath. There no other medical concerns at this time. Extremity Weakness    Pertinent negatives include no back pain. Dizziness   Pertinent negatives include no shortness of breath, no chest pain, no vomiting, no headaches and no nausea.         Past Medical History:   Diagnosis Date    GERD (gastroesophageal reflux disease)     H/O cervical fracture     History of shingles        Past Surgical History:   Procedure Laterality Date    ABDOMEN SURGERY PROC UNLISTED Right     [facility] age 23   [facility] [employer] KNEE ARTHROSCOPY Left 2000,2007    2nd scope became open surgery    HX KNEE ARTHROSCOPY Right     age 13    HX ORTHOPAEDIC  06/2015    ACDF C6-7 after a fall    HX VASECTOMY           Family History:   Problem Relation Age of Onset    Other Mother         AAA rupture    Hypertension Mother     Cancer Father         pancreatic, prostate    Alcohol abuse Sister     Thyroid Disease Sister        Social History     Socioeconomic History    Marital status:      Spouse name: Not on file    Number of children: Not on file    Years of education: Not on file    Highest education level: Not on file   Social Needs    Financial resource strain: Not on file    Food insecurity - worry: Not on file    Food insecurity - inability: Not on file   [place] needs - medical: Not on file   [place] needs - non-medical: Not on file   Occupational History    Not on file   Tobacco Use    Smoking status: Former Smoker     Packs/day: 0.50     Years: 23.00     Pack years: 11.50     Last attempt to quit: 2016     Years since quittin.9    Smokeless tobacco: Never Used   Substance and Sexual Activity    Alcohol use: No    Drug use: No    Sexual activity: Not on file   Other Topics Concern    Not on file   Social History Narrative    Not on file         ALLERGIES: Patient has no known allergies. Review of Systems   Constitutional: Negative for chills and fever. HENT: Negative for ear pain and sore throat. Eyes: Negative for pain. Respiratory: Negative for chest tightness and shortness of breath. Cardiovascular: Negative for chest pain and leg swelling. Gastrointestinal: Negative for abdominal pain, nausea and vomiting. Genitourinary: Negative for dysuria and flank pain. Musculoskeletal: Negative for back pain. Skin: Negative for rash. Neurological: Positive for dizziness and weakness. Negative for headaches. All other systems reviewed and are negative. Vitals:    18 1722   BP: 148/89   Pulse: 91   Resp: 20   Temp: 98.4 °F (36.9 °C)   SpO2: 99%   Weight: 79.4 kg (175 lb)   Height: 5' 9.5\" (1.765 m)            Physical Exam   Constitutional: He is oriented to person, place, and time. No distress. HENT:   Head: Normocephalic and atraumatic. Eyes: Conjunctivae are normal. Pupils are equal, round, and reactive to light. No scleral icterus. Neck: No tracheal deviation present. Cardiovascular: Normal rate and regular rhythm. Pulmonary/Chest: Effort normal and breath sounds normal. No stridor. No respiratory distress. Abdominal: Soft. He exhibits no distension. There is no tenderness. Musculoskeletal: He exhibits no edema or deformity. Neurological: He is alert and oriented to person, place, and time. No cranial nerve deficit. He exhibits normal muscle tone. Coordination normal.   Very mild weakness of left upper and lower ext. Normal sensation. Skin: Skin is warm and dry.    Psychiatric: He has a normal mood and affect. Nursing note and vitals reviewed. MDM  Number of Diagnoses or Management Options  Ataxia:   Weakness:   Diagnosis management comments: 52-year-old male with left-sided weakness with differential diagnosis of TIA, stroke, migraine.   - Admit to hospitalist         Procedures    5:39 PM Spoke to Dr. Lauryn Macdonald. Will see pt.      5:51 PM Spoke to Dr. Lauryn Macdonald: would admit patient for TIA vs small stroke. Would give ASA/ Plavix     6:09 PM Spoke to Dr. Braulio To for admission. Requests MRI/ MRA. Technician has left for the day. Tiburcio Lorenzana.  Mya Haro MD

## 2018-12-15 ENCOUNTER — APPOINTMENT (OUTPATIENT)
Dept: MRI IMAGING | Age: 53
DRG: 948 | End: 2018-12-15
Attending: INTERNAL MEDICINE
Payer: COMMERCIAL

## 2018-12-15 LAB
ANION GAP SERPL CALC-SCNC: 10 MMOL/L (ref 5–15)
BUN SERPL-MCNC: 17 MG/DL (ref 6–20)
BUN/CREAT SERPL: 16 (ref 12–20)
CALCIUM SERPL-MCNC: 8.9 MG/DL (ref 8.5–10.1)
CHLORIDE SERPL-SCNC: 104 MMOL/L (ref 97–108)
CHOLEST SERPL-MCNC: 211 MG/DL
CO2 SERPL-SCNC: 23 MMOL/L (ref 21–32)
CREAT SERPL-MCNC: 1.09 MG/DL (ref 0.7–1.3)
EST. AVERAGE GLUCOSE BLD GHB EST-MCNC: 131 MG/DL
GLUCOSE SERPL-MCNC: 108 MG/DL (ref 65–100)
HBA1C MFR BLD: 6.2 % (ref 4.2–6.3)
HDLC SERPL-MCNC: 42 MG/DL
HDLC SERPL: 5 {RATIO} (ref 0–5)
LDLC SERPL CALC-MCNC: 147 MG/DL (ref 0–100)
LIPID PROFILE,FLP: ABNORMAL
POTASSIUM SERPL-SCNC: 4 MMOL/L (ref 3.5–5.1)
SODIUM SERPL-SCNC: 137 MMOL/L (ref 136–145)
T4 FREE SERPL-MCNC: 0.8 NG/DL (ref 0.8–1.5)
TRIGL SERPL-MCNC: 110 MG/DL (ref ?–150)
TSH SERPL DL<=0.05 MIU/L-ACNC: 9.71 UIU/ML (ref 0.36–3.74)
VLDLC SERPL CALC-MCNC: 22 MG/DL

## 2018-12-15 PROCEDURE — 70551 MRI BRAIN STEM W/O DYE: CPT

## 2018-12-15 PROCEDURE — 80061 LIPID PANEL: CPT

## 2018-12-15 PROCEDURE — 74011250637 HC RX REV CODE- 250/637: Performed by: FAMILY MEDICINE

## 2018-12-15 PROCEDURE — 83036 HEMOGLOBIN GLYCOSYLATED A1C: CPT

## 2018-12-15 PROCEDURE — 65270000029 HC RM PRIVATE

## 2018-12-15 PROCEDURE — 72141 MRI NECK SPINE W/O DYE: CPT

## 2018-12-15 PROCEDURE — 36415 COLL VENOUS BLD VENIPUNCTURE: CPT

## 2018-12-15 PROCEDURE — 99218 HC RM OBSERVATION: CPT

## 2018-12-15 PROCEDURE — 94762 N-INVAS EAR/PLS OXIMTRY CONT: CPT

## 2018-12-15 PROCEDURE — 84443 ASSAY THYROID STIM HORMONE: CPT

## 2018-12-15 PROCEDURE — 80048 BASIC METABOLIC PNL TOTAL CA: CPT

## 2018-12-15 PROCEDURE — 84439 ASSAY OF FREE THYROXINE: CPT

## 2018-12-15 PROCEDURE — 70544 MR ANGIOGRAPHY HEAD W/O DYE: CPT

## 2018-12-15 PROCEDURE — 74011250637 HC RX REV CODE- 250/637: Performed by: INTERNAL MEDICINE

## 2018-12-15 RX ORDER — CALCIUM CARBONATE 200(500)MG
400 TABLET,CHEWABLE ORAL
Status: DISCONTINUED | OUTPATIENT
Start: 2018-12-15 | End: 2018-12-16 | Stop reason: HOSPADM

## 2018-12-15 RX ADMIN — Medication 10 ML: at 14:00

## 2018-12-15 RX ADMIN — Medication 10 ML: at 06:00

## 2018-12-15 RX ADMIN — ANTACID TABLETS 400 MG: 500 TABLET, CHEWABLE ORAL at 23:45

## 2018-12-15 RX ADMIN — ASPIRIN 325 MG: 325 TABLET, COATED ORAL at 08:23

## 2018-12-15 RX ADMIN — Medication 10 ML: at 22:00

## 2018-12-15 NOTE — PROGRESS NOTES
TRANSFER - IN REPORT:    Verbal report received from Amoret(name) on Britney Johnson  being received from [facility](unit) for routine progression of care      Report consisted of patients Situation, Background, Assessment and   Recommendations(SBAR). Information from the following report(s) SBAR, Kardex, ED Summary, Accordion and Recent Results was reviewed with the receiving nurse. Opportunity for questions and clarification was provided. Assessment completed upon patients arrival to unit and care assumed.      2230: Primary Nurse Trent Maloney and Sarah Santiago, RN performed a dual skin assessment on this patient No impairment noted  Josiah score is 23

## 2018-12-15 NOTE — CONSULTS
NEUROLOGY IN-PATIENT NEW CONSULTATION      12/15/2018    RE: Gabino Grant         1965      REFERRED BY:  Niall Gil MD      CHIEF COMPLAINT:  This is Gabino Grant is a 47 y.o. male right handed  who had concerns including Extremity Weakness and Dizziness. HPI:     Patient had C6C7 surgery in 2015 s/p fall. Patient was at work when he noted weakness of the L leg described as giving out. ALso noted numbness and weakness of the L UE. Due to persistent symptoms, patient went to ER.     (-) facial symptoms  (-) dysarthria  (-) dysphagia  (-) chest pain  (+) neck will \"lock up\"      ROS   (-) fever  (-) chills  All other systems reviewed and are negative    Past Medical Hx  Past Medical History:   Diagnosis Date    GERD (gastroesophageal reflux disease)     H/O cervical fracture     History of shingles        Social Hx  Social History     Socioeconomic History    Marital status:      Spouse name: Not on file    Number of children: Not on file    Years of education: Not on file    Highest education level: Not on file   Tobacco Use    Smoking status: Former Smoker     Packs/day: 0.50     Years: 23.00     Pack years: 11.50     Last attempt to quit: 2016     Years since quittin.9    Smokeless tobacco: Never Used   Substance and Sexual Activity    Alcohol use: No    Drug use: No       Family Hx  Family History   Problem Relation Age of Onset    Other Mother         AAA rupture    Hypertension Mother     Cancer Father         pancreatic, prostate    Alcohol abuse Sister     Thyroid Disease Sister        ALLERGIES  No Known Allergies    CURRENT MEDS  Current Facility-Administered Medications   Medication Dose Route Frequency Provider Last Rate Last Dose    sodium chloride (NS) flush 5-10 mL  5-10 mL IntraVENous Q8H Pawel Bello MD   10 mL at 12/15/18 0600    sodium chloride (NS) flush 5-10 mL  5-10 mL IntraVENous PRN Pawel Bello MD        [facility]ron [facility]) injection 4 mg  4 mg IntraVENous Q6H PRN Timothy Mabry MD        aspirin tablet 325 mg  325 mg Oral DAILY Timothy Mabry MD   325 mg at 12/15/18 9365    acetaminophen (TYLENOL) tablet 650 mg  650 mg Oral Q4H PRN Timothy Mabry MD        Or    acetaminophen (TYLENOL) solution 650 mg  650 mg Per NG tube Q4H PRN Timothy Mabry MD        Or    acetaminophen (TYLENOL) suppository 650 mg  650 mg Rectal Q4H PRN Timothy Mabry MD        enoxaparin (LOVENOX) injection 40 mg  40 mg SubCUTAneous Q24H Timothy Mabyr MD        0.9% sodium chloride infusion  75 mL/hr IntraVENous CONTINUOUS Timothy Mabry MD 75 mL/hr at 12/14/18 2347 75 mL/hr at 12/14/18 2347           PREVIOUS WORKUP: (reviewed)  IMAGING:    CT Results (recent):  Results from [place] encounter on 12/14/18   CT CODE NEURO HEAD WO CONTRAST    Narrative INDICATION:  ataxia, extremity weakness, dizziness. Code neuro. Left-sided  weakness and dizziness since 1500 hours today. Left lower extremity ataxia. EXAM: CT HEAD WITHOUT CONTRAST. COMPARISON: None. PROCEDURE: Sequential axial images of the head were performed without  intravenous contrast. Soft tissue and bone windows were examined. Images were  reformatted in the sagittal and coronal planes. .CT dose reduction was achieved  through use of a standardized protocol tailored for this examination and  automatic exposure control for dose modulation. FINDINGS: The brain parenchyma and ventricular system are unremarkable in  appearance for age. There is no parenchymal mass or hemorrhage and no shift of  midline structures or extra-axial collection. No obvious acute ischemia. No bony  abnormality. Impression IMPRESSION: No acute intracranial abnormality. MRI Results (recent):  Results from [place] encounter on 06/20/15   MRI CERV SPINE WO CONT    Narrative **Final Report**       ICD Codes / Adm. Diagnosis:    / C7 neck fracture  Cervical spine fracture [facility])  Examination:  MR Brenda Rodrigez SPINE WO CON  - [id number] - Jun 21 2015  5:49PM  Accession No:  [id number]  Reason:  new onset tingling/numbness to L arm      REPORT:  INDICATION: New onset tingling and numbness left arm  TECHNIQUE: Sagittal T1, T2, STIR and axial T1 and T2-weighted images of the   cervical spine were obtained. COMPARISON: MRI 6/20/15  FINDINGS:  The cervical vertebra are in good alignment  The craniocervical junction is  unremarkable. C2-C3: No disc bulge or stenosis. C3-C4: No disc bulge or stenosis. C4-C5: No disc bulge or stenosis. C5-C6: No disc bulge or stenosis. C6-C7: There has been interval ACDF with plate and screws. Vertebra appear   to be in good alignment. Mild narrowing of the canal without definite cord   compression. The cervical spinal cord has normal signal. Residual findings   of the right C7 superior facet fracture. C7-T1: No disc bulge or stenosis. T1-T2: No disc bulge or stenosis. IMPRESSION:   1. No unusual postoperative findings. Signing/Reading Doctor: Dellia Ormond ([id number])    Approved: Dellia Ormond ([id number])  Jun 21 2015  5:58PM                                   IR Results (recent):  No results found for this or any previous visit. VAS/US Results (recent):  No results found for this or any previous visit. LABS (reviewed)  Results for orders placed or performed during the hospital encounter of 12/14/18   URINE CULTURE HOLD SAMPLE   Result Value Ref Range    Urine culture hold        URINE ON HOLD IN MICROBIOLOGY DEPT FOR 3 DAYS. IF UNPRESERVED URINE IS SUBMITTED, IT CANNOT BE USED FOR ADDITIONAL TESTING AFTER 24 HRS, RECOLLECTION WILL BE REQUIRED. SAMPLES BEING HELD   Result Value Ref Range    SAMPLES BEING HELD 1 RED 1 BLUE     COMMENT        Add-on orders for these samples will be processed based on acceptable specimen integrity and analyte stability, which may vary by analyte.    CBC WITH AUTOMATED DIFF   Result Value Ref Range WBC 9.1 4.1 - 11.1 K/uL    RBC 5.10 4. 10 - 5.70 M/uL    HGB 15.2 12.1 - 17.0 g/dL    HCT 45.3 36.6 - 50.3 %    MCV 88.8 80.0 - 99.0 FL    MCH 29.8 26.0 - 34.0 PG    MCHC 33.6 30.0 - 36.5 g/dL    RDW 12.4 11.5 - 14.5 %    PLATELET 006 179 - 312 K/uL    MPV 10.9 8.9 - 12.9 FL    NEUTROPHILS 51 32 - 75 %    LYMPHOCYTES 34 12 - 49 %    MONOCYTES 11 5 - 13 %    EOSINOPHILS 3 0 - 7 %    BASOPHILS 1 0 - 1 %    ABS. NEUTROPHILS 4.7 1.8 - 8.0 K/UL    ABS. LYMPHOCYTES 3.1 K/UL    ABS. MONOCYTES 1.0 0.0 - 1.0 K/UL    ABS. EOSINOPHILS 0.2 0.0 - 0.4 K/UL    ABS.  BASOPHILS 0.1 0.0 - 0.1 K/UL    DF AUTOMATED      XXWBCSUS 0     METABOLIC PANEL, BASIC   Result Value Ref Range    Sodium 139 136 - 145 mmol/L    Potassium 4.0 3.5 - 5.1 mmol/L    Chloride 102 97 - 108 mmol/L    CO2 27 21 - 32 mmol/L    Anion gap 10 5 - 15 mmol/L    Glucose 105 (H) 65 - 100 mg/dL    BUN 18 6 - 20 MG/DL    Creatinine 1.34 (H) 0.70 - 1.30 MG/DL    BUN/Creatinine ratio 13 12 - 20      GFR est AA >60 >60 ml/min/1.73m2    GFR est non-AA 56 (L) >60 ml/min/1.73m2    Calcium 9.7 8.5 - 10.1 MG/DL   TROPONIN I   Result Value Ref Range    Troponin-I, Qt. <0.05 <0.05 ng/mL   URINALYSIS W/MICROSCOPIC   Result Value Ref Range    Color Light Yellow      Appearance CLEAR CLEAR      Specific gravity 1.006 1.003 - 1.030      pH (UA) 6.0 5.0 - 8.0      Protein NEGATIVE  NEG mg/dL    Glucose NEGATIVE  NEG mg/dL    Ketone NEGATIVE  NEG mg/dL    Bilirubin NEGATIVE  NEG      Blood NEGATIVE  NEG      Urobilinogen 0.2 0.2 - 1.0 EU/dL    Nitrites NEGATIVE  NEG      Leukocyte Esterase NEGATIVE  NEG      WBC 0-4 0 - 4 /hpf    RBC 0-5 0 - 5 /hpf    Epithelial cells FEW FEW /lpf    Bacteria NEGATIVE  NEG /hpf   LIPID PANEL   Result Value Ref Range    LIPID PROFILE          Cholesterol, total 211 (H) <200 MG/DL    Triglyceride 110 <150 MG/DL    HDL Cholesterol 42 MG/DL    LDL, calculated 147 (H) 0 - 100 MG/DL    VLDL, calculated 22 MG/DL    CHOL/HDL Ratio 5.0 0 - 5.0     HEMOGLOBIN A1C WITH EAG   Result Value Ref Range    Hemoglobin A1c 6.2 4.2 - 6.3 %    Est. average glucose 172 mg/dL   METABOLIC PANEL, BASIC   Result Value Ref Range    Sodium 137 136 - 145 mmol/L    Potassium 4.0 3.5 - 5.1 mmol/L    Chloride 104 97 - 108 mmol/L    CO2 23 21 - 32 mmol/L    Anion gap 10 5 - 15 mmol/L    Glucose 108 (H) 65 - 100 mg/dL    BUN 17 6 - 20 MG/DL    Creatinine 1.09 0.70 - 1.30 MG/DL    BUN/Creatinine ratio 16 12 - 20      GFR est AA >60 >60 ml/min/1.73m2    GFR est non-AA >60 >60 ml/min/1.73m2    Calcium 8.9 8.5 - 10.1 MG/DL   T4, FREE   Result Value Ref Range    T4, Free 0.8 0.8 - 1.5 NG/DL   TSH 3RD GENERATION   Result Value Ref Range    TSH 9.71 (H) 0.36 - 3.74 uIU/mL   GLUCOSE, POC   Result Value Ref Range    Glucose (POC) 96 65 - 100 mg/dL    Performed by Yamileth Lopez        Physical Exam:     Visit Vitals  /78 (BP 1 Location: Left arm, BP Patient Position: At rest)   Pulse (!) 59   Temp 97.4 °F (36.3 °C)   Resp 17   Ht 5' 9.5\" (1.765 m)   Wt 79.4 kg (175 lb)   SpO2 95%   BMI 25.47 kg/m²     General:  Alert, cooperative, no distress. Head:  Normocephalic, without obvious abnormality, atraumatic. Eyes:  Conjunctivae/corneas clear. Lungs:  Heart:   Non labored breathing  Regular rate and rhythm, no carotid bruits   Abdomen:   Soft, non-distended   Extremities: Extremities normal, atraumatic, no cyanosis or edema. Pulses: 2+ and symmetric all extremities. Skin: Skin color, texture, turgor normal. No rashes or lesions.   Neurologic Exam     Gen: Attention normal             Language: naming, repetition, fluency normal             Memory: intact recent and remote memory  Cranial Nerves:  I: smell Not tested   II: visual fields Full to confrontation   II: pupils Equal, round, reactive to light   II: optic disc No papilledema   III,VII: ptosis none   III,IV,VI: extraocular muscles  Full ROM   V: mastication normal   V: facial light touch sensation  normal   VII: facial muscle function   symmetric   VIII: hearing symmetric   IX: soft palate elevation  normal   XI: trapezius strength  5/5   XI: sternocleidomastoid strength 5/5   XI: neck flexion strength  5/5   XII: tongue  midline     Motor: normal bulk and tone, no tremor              Strength: 5/5 all four extremities  Sensory: intact to LT, PP, vibration, and temperature  Reflexes: 1+ throughout; Down going toes  Coordination: Good FTN and HTS  Gait: deferred           Impression:     Oj Alvarez is a 47 y.o. male who  has a past medical history of GERD (gastroesophageal reflux disease), H/O cervical fracture, and History of shingles. who had C6C7 surgery in 2015 s/p fall. Patient was at work when he noted weakness of the L leg described as giving out. Also noted numbness and weakness of the L UE. Due to persistent symptoms, patient went to ER. (+) neck will \"lock up\"    Considerations include cervical pathology and stroke. RECOMMENDATIONS  1. Awaiting MRI brain result  2. MRI cervical spine, pending results  3. PT/OT  4.  Depending on above, will consider ortho referral or stroke work up    Please call for questions        Thank you for the consultation      Navya Galvan MD  Diplomate, American Board of Psychiatry and Neurology  Diplomate, Neuromuscular Medicine  Diplomate, American Board of Electrodiagnostic Medicine    Greater than 50% of time spent counseling patient        CC: Beba Husain MD  Fax: [phone]

## 2018-12-15 NOTE — ED NOTES
TRANSFER - OUT REPORT:    Verbal report given to Petr Bonds RN(name) on Bethany Marengo  being transferred to [facility](unit) for routine progression of care       Report consisted of patients Situation, Background, Assessment and   Recommendations(SBAR). Information from the following report(s) SBAR, ED Summary, MAR, Recent Results and Cardiac Rhythm nsr was reviewed with the receiving nurse. Lines:   Peripheral IV 12/14/18 Left Antecubital (Active)   Site Assessment Clean, dry, & intact 12/14/2018  5:34 PM   Phlebitis Assessment 0 12/14/2018  5:34 PM   Infiltration Assessment 0 12/14/2018  5:34 PM   Dressing Status Clean, dry, & intact 12/14/2018  5:34 PM   Dressing Type Tape;Transparent 12/14/2018  5:34 PM   Hub Color/Line Status Patent; Flushed 12/14/2018  5:34 PM   Action Taken Blood drawn 12/14/2018  5:34 PM        Opportunity for questions and clarification was provided.       Patient transported with:   Monitor

## 2018-12-15 NOTE — ED NOTES
Update called to Yanira Sanchez at [phone] at [facility]. Bedside report given to Yanira Chaudhry with [facility]. Transfer Assessment: Patient A&O x4 and in no distress. Physical re-examination reveals improvement in pts condition with reassessment of vital signs completed at the time of admission transfer.   Verification of hospital location [facility] and room 501 completed with EMS provider

## 2018-12-15 NOTE — PROGRESS NOTES
Physical Therapy  12/15/2018    Chart reviewed for evaluation and orders acknowledged. Pt is not present in room and off the floor for testing(MRI). Will follow up later today as time allows to complete evaluation.     Thank Ricky Alcala, PT, DPT

## 2018-12-15 NOTE — PROGRESS NOTES
17: 47 Patient and wife questioned ability to discharge tonight. Patient reports LLE being improved and only feeling swelling in C6-C8. Michelle notified, and feels patient may discharge. Per MD instruct patient not to do any heavy lifting and follow-up with neuro-surgeon on Monday morning. 18:30 Contacted Dr. Pina Tate as attending MD for discharge order. Per MD patient is unable to get to a computer any time soon and is unable to discharge patient. Per MD Dr. Dinora Cassidy may discharge patient. Spoke with Dr. Dniora Cassidy who is unable to discharge patient. 18:40 Patient and spouse informed patient will remain in the hospital overnight and possible discharge first thing in the morning.

## 2018-12-15 NOTE — ED NOTES
Pt assisted with [employer] dinner. Pt ate 100% and tolerated with no complaint. Wife at bedside. Pt updated on delay of transfer.

## 2018-12-15 NOTE — H&P
Admission History and Physical      NAME:  Dorothy Hitchcock   :   1965   MRN:  [id number]     PCP:  Casper Cruz MD     Date/Time:  2018           Assessment/Plan:       Left-sided weakness (2018): No hx of HTN, DM, XOL. Quit tobacco 3 years ago. No FH of CVA. Less likely CVA or TIA. There is concern that this could be cervical spine disease related. Given both ASA and Plavix in ED.  -- neuro checks  -- neurology consultation  -- MRI / MRA brain  -- MRI c-spine  -- carotid doppler  -- echocardiogram  -- check lipid profile  -- ASA daily  -- PT / OT / Speech evaluation    Elevated creatinine:  Last creatinine was 0.90 in . Today is 1.34.  -- IVF  -- repeat labs in AM    Signed out to Dr. Viktor Munoz. Subjective:     CHIEF COMPLAINT:  Left sided weakness. HISTORY OF PRESENT ILLNESS:     Mr. Susana Goff is a 47 y.o.  male who is admitted with Left-sided weakness. Mr. Susana Goff presented to the Emergency Department today complaining of left sided weakness. Patient was at work today and while walking noticed his left leg felt weak, like it was going to give out at the knee. Also had some numbness and tingling of the left arm. This was associated with feeling dizzy. Was assisted back to his office and drank water w/o difficulty. Continued to have symptoms, so drove himself to the ED 1 hour away from work. No facial weakness, numbness, change in speech. No fever, chills. Denies sob or chest pain. Has hx of cervical spine fx s/p surgery. Since then reports the neck will lock up at times and has to use his hands on his head to unlock his neck. Patient reports doing this prior to the onset of the above symptoms and a louder than usual pop heard. Denies neck pains.       Past Medical History:   Diagnosis Date    GERD (gastroesophageal reflux disease)     H/O cervical fracture     History of shingles         Past Surgical History:   Procedure Laterality Date    ABDOMEN SURGERY PROC UNLISTED Right     [facility] age 23   [facility] HX KNEE ARTHROSCOPY Left ,    2nd scope became open surgery    HX KNEE ARTHROSCOPY Right     age 13    HX ORTHOPAEDIC  2015    ACDF C6-7 after a fall    HX VASECTOMY         Social History     Tobacco Use    Smoking status: Former Smoker     Packs/day: 0.50     Years: 23.00     Pack years: 11.50     Last attempt to quit: 2016     Years since quittin.9    Smokeless tobacco: Never Used   Substance Use Topics    Alcohol use: No        Family History   Problem Relation Age of Onset    Other Mother         AAA rupture    Hypertension Mother     Cancer Father         pancreatic, prostate    Alcohol abuse Sister     Thyroid Disease Sister         No Known Allergies     Prior to Admission medications    Medication Sig Start Date End Date Taking? Authorizing Provider   HYDROcodone-acetaminophen (NORCO) 5-325 mg per tablet Take 1 Tab by mouth every four (4) hours as needed for Pain. Max Daily Amount: 6 Tabs. 17   Lurdes Miranda MD   omeprazole (PRILOSEC) 20 mg capsule Take 20 mg by mouth as needed. Provider, Historical   CALCIUM CARBONATE (TUMS PO) Take  by mouth daily as needed.     Provider, Historical         Review of Systems:  (bold if positive, if negative)    Gen:  Eyes:  ENT:  CVS:  Pulm:  GI:    MS:  weaknessSkin:  Endo:    Hem:  Renal:    Neuro:  Numbnessweakness          Objective:      VITALS:    Vital signs reviewed; most recent are:    Visit Vitals  /86   Pulse 70   Temp 98.4 °F (36.9 °C)   Resp 15   Ht 5' 9.5\" (1.765 m)   Wt 79.4 kg (175 lb)   SpO2 95%   BMI 25.47 kg/m²     SpO2 Readings from Last 6 Encounters:   18 95%   17 97%   17 98%   06/22/15 95%   06/20/15 96%        No intake or output data in the 24 hours ending 18         Exam:     Physical Exam:    Gen:  Well-developed, well-nourished, in no acute distress  HEENT:  Pink conjunctivae, PERRL, hearing intact to voice, moist mucous membranes  Neck:  Supple  Resp:  No accessory muscle use, clear breath sounds without wheezes rales or rhonchi  Card:  No murmurs, normal S1, S2 without thrills, bruits or peripheral edema  Abd:  Soft, non-tender, non-distended, normoactive bowel sounds are present  Musc:  No cyanosis  Skin:  No rashes or ulcers, skin turgor is good  Neuro:  Cranial nerves 3-12 are grossly intact,  strength is 5/5 bilaterally, dorsi / plantarflexion strength is 5/5 bilaterally, follows commands appropriately, no pronator drift  Psych:  Alert with good insight. Oriented to person, place, and time       Labs:    Recent Labs     12/14/18  1736   WBC 9.1   HGB 15.2   HCT 45.3        Recent Labs     12/14/18  1736      K 4.0      CO2 27   *   BUN 18   CREA 1.34*   CA 9.7     Lab Results   Component Value Date/Time    Glucose (POC) 96 12/14/2018 05:17 PM     No results for input(s): PH, PCO2, PO2, HCO3, FIO2 in the last 72 hours. No results for input(s): INR in the last 72 hours. No lab exists for component: INREXT     CT head:  No acute process. EKG reviewed:   Initial EKG: sinus rhythm, lateral TWI    Medical records reviewed in preparation for this admission: Old medical records.     Surrogate decision maker:  spouse    Total time spent in care of this patient: [address] discussed with: Patient and Family    Discussed:  Care Plan    Prophylaxis:  Lovenox    Probable Disposition:  Home w/Family           ___________________________________________________    Attending Physician: Eda Castillo MD

## 2018-12-15 NOTE — ED NOTES
Stroke Education provided to patient and the following topics were discussed    1. Patients personal risk factors for stroke are none    2. Warning signs of Stroke:        * Sudden numbness or weakness of the face, arm or leg, especially on one side of          The body            * Sudden confusion, trouble speaking or understanding        * Sudden trouble seeing in one or both eyes        * Sudden trouble walking, dizziness, loss of balance or coordination        * Sudden severe headache with no known cause    3. Importance of activation Emergency Medical Services ( 9-1-1 ) immediately if experience any warning signs of stroke. 4. Be sure and schedule a follow-up appointment with your primary care doctor or any specialists as instructed. 5. You must take medicine every day to treat your risk factors for stroke. Be sure to take your medicines exactly as your doctor tells you: no more, no less. Know what your medicines are for , what they do. Anti-thrombotics /anticoagulants can help prevent strokes. You are taking the following medicine(s)     6.  Smoking and second-hand smoke greatly increase your risk of stroke, cardiovascular disease and death. Smoking history ended year 2016    7. Information provided was [facility] Stroke Education [employer] or Verbal Education    8. Documentation of teaching completed in Patient Education Activity and on Care Plan with teaching response noted?   yes

## 2018-12-15 NOTE — PROGRESS NOTES
Daily Progress Note: 12/15/2018  Yue Martinez MD    Assessment/Plan:   Left-sided weakness (12/14/2018): No hx of HTN, DM, XOL. Quit tobacco 3 years ago. No FH of CVA. Less likely CVA or TIA. There is concern that this could be cervical spine disease related. Given both ASA and Plavix in ED.  -- neuro checks  -- neurology consultation  -- MRI / MRA brain  -- MRI c-spine  -- carotid doppler  -- echocardiogram  -- check lipid profile  -- ASA daily  -- PT / OT / Speech evaluation     Elevated creatinine:  Last creatinine was 0.90 in 2015. Today is 1.34.  -- IVF  -- repeat labs           Problem List:  Problem List as of 12/15/2018 Date Reviewed: 6/22/2015          Codes Class Noted - Resolved    * (Principal) Left-sided weakness ICD-10-CM: R53.1  ICD-9-CM: 728.87  12/14/2018 - Present        Cervical spine fracture ([facility] [address] ICD-10-CM: S12. 9XXA  ICD-9-CM: 805.00  6/20/2015 - Present              HPI:   Mr. Imelda Carver is a 47 y.o.  male who is admitted with Left-sided weakness. Mr. Imelda Carver presented to the Emergency Department today complaining of left sided weakness. Patient was at work today and while walking noticed his left leg felt weak, like it was going to give out at the knee. Also had some numbness and tingling of the left arm. This was associated with feeling dizzy. Was assisted back to his office and drank water w/o difficulty. Continued to have symptoms, so drove himself to the ED 1 hour away from work. No facial weakness, numbness, change in speech. No fever, chills. Denies sob or chest pain. Has hx of cervical spine fx s/p surgery. Since then reports the neck will lock up at times and has to use his hands on his head to unlock his neck. Patient reports doing this prior to the onset of the above symptoms and a louder than usual pop heard. Denies neck pains. (Dr Chelo Fink)    12/15: More dizziness this am. Less weakness in his leg. Worse with ambulation.  Had to be very careful getting OOB. No headache. No dysphagia or choking. Review of Systems:   A comprehensive review of systems was negative except for that written in the HPI. Objective:   Physical Exam:     Visit Vitals  /78 (BP 1 Location: Left arm, BP Patient Position: At rest)   Pulse (!) 59   Temp 97.4 °F (36.3 °C)   Resp 17   Ht 5' 9.5\" (1.765 m)   Wt 175 lb (79.4 kg)   SpO2 95%   BMI 25.47 kg/m²      O2 Device: Room air    Temp (24hrs), Av.8 °F (36.6 °C), Min:97.4 °F (36.3 °C), Max:98.4 °F (36.9 °C)    No intake/output data recorded. No intake/output data recorded. General:  Alert, cooperative, no distress, appears stated age. Head:  Normocephalic, without obvious abnormality, atraumatic. Eyes:  Conjunctivae/corneas clear. PERRL, EOMs intact. Nose: Nares normal. Septum midline. Mucosa normal. No drainage or sinus tenderness. Throat: Lips, mucosa, and tongue normal. Teeth and gums normal.   Neck: Supple, symmetrical, trachea midline, no adenopathy, thyroid: no enlargement/tenderness/nodules, no carotid bruit and no JVD. Back:   Symmetric, no curvature. ROM normal. No CVA tenderness. Lungs:   Clear to auscultation bilaterally. Chest wall:  No tenderness or deformity. Heart:  Regular rate and rhythm, S1, S2 normal, no murmur, click, rub or gallop. Abdomen:   Soft, non-tender. Bowel sounds normal. No masses,  No organomegaly. Extremities: Extremities normal, atraumatic, no cyanosis or edema. No calf tenderness or cords. Pulses: 2+ and symmetric all extremities. Skin: Skin color, texture, turgor normal. No rashes or lesions   Neurologic: CNII-XII intact. Alert and oriented X 3. Fine motor of hands and fingers normal.   equal.  No cogwheeling or rigidity. Gait not tested at this time. Sensation grossly normal to touch.   Gross motor of extremities normal.       Data Review:       Recent Days:  Recent Labs     18  1736   WBC 9.1   HGB 15.2   HCT 45.3    Recent Labs     12/15/18  0457 12/14/18  1736    139   K 4.0 4.0    102   CO2 23 27   * 105*   BUN 17 18   CREA 1.09 1.34*   CA 8.9 9.7     No results for input(s): PH, PCO2, PO2, HCO3, FIO2 in the last 72 hours. 24 Hour Results:  Recent Results (from the past 24 hour(s))   GLUCOSE, POC    Collection Time: 12/14/18  5:17 PM   Result Value Ref Range    Glucose (POC) 96 65 - 100 mg/dL    [facility]    Collection Time: 12/14/18  5:36 PM   Result Value Ref Range    SAMPLES BEING HELD 1 RED 1 BLUE     COMMENT        Add-on orders for these samples will be processed based on acceptable specimen integrity and analyte stability, which may vary by analyte. CBC WITH AUTOMATED DIFF    Collection Time: 12/14/18  5:36 PM   Result Value Ref Range    WBC 9.1 4.1 - 11.1 K/uL    RBC 5.10 4. 10 - 5.70 M/uL    HGB 15.2 12.1 - 17.0 g/dL    HCT 45.3 36.6 - 50.3 %    MCV 88.8 80.0 - 99.0 FL    MCH 29.8 26.0 - 34.0 PG    MCHC 33.6 30.0 - 36.5 g/dL    RDW 12.4 11.5 - 14.5 %    PLATELET 129 164 - 035 K/uL    MPV 10.9 8.9 - 12.9 FL    NEUTROPHILS 51 32 - 75 %    LYMPHOCYTES 34 12 - 49 %    MONOCYTES 11 5 - 13 %    EOSINOPHILS 3 0 - 7 %    BASOPHILS 1 0 - 1 %    ABS. NEUTROPHILS 4.7 1.8 - 8.0 K/UL    ABS. LYMPHOCYTES 3.1 K/UL    ABS. MONOCYTES 1.0 0.0 - 1.0 K/UL    ABS. EOSINOPHILS 0.2 0.0 - 0.4 K/UL    ABS.  BASOPHILS 0.1 0.0 - 0.1 K/UL    DF AUTOMATED      XXWBCSUS 0     METABOLIC PANEL, BASIC    Collection Time: 12/14/18  5:36 PM   Result Value Ref Range    Sodium 139 136 - 145 mmol/L    Potassium 4.0 3.5 - 5.1 mmol/L    Chloride 102 97 - 108 mmol/L    CO2 27 21 - 32 mmol/L    Anion gap 10 5 - 15 mmol/L    Glucose 105 (H) 65 - 100 mg/dL    BUN 18 6 - 20 MG/DL    Creatinine 1.34 (H) 0.70 - 1.30 MG/DL    BUN/Creatinine ratio 13 12 - 20      GFR est AA >60 >60 ml/min/1.73m2    GFR est non-AA 56 (L) >60 ml/min/1.73m2    Calcium 9.7 8.5 - 10.1 MG/DL   TROPONIN I    Collection Time: 12/14/18  5:36 PM   Result Value Ref Range    Troponin-I, Qt. <0.05 <0.05 ng/mL   URINALYSIS W/MICROSCOPIC    Collection Time: 12/14/18  7:26 PM   Result Value Ref Range    Color Light Yellow      Appearance CLEAR CLEAR      Specific gravity 1.006 1.003 - 1.030      pH (UA) 6.0 5.0 - 8.0      Protein NEGATIVE  NEG mg/dL    Glucose NEGATIVE  NEG mg/dL    Ketone NEGATIVE  NEG mg/dL    Bilirubin NEGATIVE  NEG      Blood NEGATIVE  NEG      Urobilinogen 0.2 0.2 - 1.0 EU/dL    Nitrites NEGATIVE  NEG      Leukocyte Esterase NEGATIVE  NEG      WBC 0-4 0 - 4 /hpf    RBC 0-5 0 - 5 /hpf    Epithelial cells FEW FEW /lpf    Bacteria NEGATIVE  NEG /hpf   URINE CULTURE HOLD SAMPLE    Collection Time: 12/14/18  7:26 PM   Result Value Ref Range    Urine culture hold        URINE ON HOLD IN MICROBIOLOGY DEPT FOR 3 DAYS. IF UNPRESERVED URINE IS SUBMITTED, IT CANNOT BE USED FOR ADDITIONAL TESTING AFTER 24 HRS, RECOLLECTION WILL BE REQUIRED.    METABOLIC PANEL, BASIC    Collection Time: 12/15/18  4:57 AM   Result Value Ref Range    Sodium 137 136 - 145 mmol/L    Potassium 4.0 3.5 - 5.1 mmol/L    Chloride 104 97 - 108 mmol/L    CO2 23 21 - 32 mmol/L    Anion gap 10 5 - 15 mmol/L    Glucose 108 (H) 65 - 100 mg/dL    BUN 17 6 - 20 MG/DL    Creatinine 1.09 0.70 - 1.30 MG/DL    BUN/Creatinine ratio 16 12 - 20      GFR est AA >60 >60 ml/min/1.73m2    GFR est non-AA >60 >60 ml/min/1.73m2    Calcium 8.9 8.5 - 10.1 MG/DL   TSH 3RD GENERATION    Collection Time: 12/15/18  4:57 AM   Result Value Ref Range    TSH 9.71 (H) 0.36 - 3.74 uIU/mL       Medications reviewed  Current Facility-Administered Medications   Medication Dose Route Frequency    sodium chloride (NS) flush 5-10 mL  5-10 mL IntraVENous Q8H    sodium chloride (NS) flush 5-10 mL  5-10 mL IntraVENous PRN    ondansetron (ZOFRAN) injection 4 mg  4 mg IntraVENous Q6H PRN    aspirin tablet 325 mg  325 mg Oral DAILY    acetaminophen (TYLENOL) tablet 650 mg  650 mg Oral Q4H PRN Or    acetaminophen (TYLENOL) solution 650 mg  650 mg Per NG tube Q4H PRN    Or    acetaminophen (TYLENOL) suppository 650 mg  650 mg Rectal Q4H PRN    enoxaparin (LOVENOX) injection 40 mg  40 mg SubCUTAneous Q24H    0.9% sodium chloride infusion  75 mL/hr IntraVENous CONTINUOUS       Care Plan discussed with: Patient/Family    Total time spent with patient and review of records: 30 minutes.     Carlos Samuel MD

## 2018-12-15 NOTE — PROGRESS NOTES
Bedside and Verbal shift change [id number] by Ravin Solis (offgoing nurse). Report included the following information SBAR, Kardex, ED Summary, Intake/Output, MAR, Accordion and Recent Results.

## 2018-12-16 VITALS
HEART RATE: 62 BPM | RESPIRATION RATE: 16 BRPM | OXYGEN SATURATION: 97 % | BODY MASS INDEX: 25.05 KG/M2 | TEMPERATURE: 98.2 F | SYSTOLIC BLOOD PRESSURE: 120 MMHG | WEIGHT: 175 LBS | HEIGHT: 70 IN | DIASTOLIC BLOOD PRESSURE: 62 MMHG

## 2018-12-16 LAB
ATRIAL RATE: 75 BPM
CALCULATED P AXIS, ECG09: 45 DEGREES
CALCULATED R AXIS, ECG10: 80 DEGREES
CALCULATED T AXIS, ECG11: 34 DEGREES
DIAGNOSIS, 93000: NORMAL
P-R INTERVAL, ECG05: 156 MS
Q-T INTERVAL, ECG07: 384 MS
QRS DURATION, ECG06: 88 MS
QTC CALCULATION (BEZET), ECG08: 428 MS
VENTRICULAR RATE, ECG03: 75 BPM

## 2018-12-16 PROCEDURE — 74011250637 HC RX REV CODE- 250/637: Performed by: FAMILY MEDICINE

## 2018-12-16 PROCEDURE — 96374 THER/PROPH/DIAG INJ IV PUSH: CPT

## 2018-12-16 PROCEDURE — 74011250637 HC RX REV CODE- 250/637: Performed by: INTERNAL MEDICINE

## 2018-12-16 RX ORDER — LEVOTHYROXINE SODIUM 50 UG/1
50 TABLET ORAL
Qty: 60 TAB | Refills: 0 | Status: SHIPPED | OUTPATIENT
Start: 2018-12-16

## 2018-12-16 RX ORDER — ASPIRIN 325 MG
325 TABLET ORAL DAILY
Qty: 30 TAB | Refills: 0 | Status: SHIPPED
Start: 2018-12-16

## 2018-12-16 RX ORDER — LEVOTHYROXINE SODIUM 50 UG/1
50 TABLET ORAL
Status: DISCONTINUED | OUTPATIENT
Start: 2018-12-16 | End: 2018-12-16 | Stop reason: HOSPADM

## 2018-12-16 RX ADMIN — Medication 10 ML: at 05:28

## 2018-12-16 RX ADMIN — LEVOTHYROXINE SODIUM 50 MCG: 50 TABLET ORAL at 07:38

## 2018-12-16 RX ADMIN — ASPIRIN 325 MG: 325 TABLET, COATED ORAL at 08:55

## 2018-12-16 NOTE — PROGRESS NOTES
Daily Progress Note: 12/16/2018  Jean Silva MD    Assessment/Plan:   Left-sided weakness (12/14/2018): No hx of HTN, DM, XOL. Quit tobacco 3 years ago. No FH of CVA. Less likely CVA or TIA. There is concern that this could be cervical spine disease related. Given both ASA and Plavix in ED.    -- neurology consultation  -- MRI / MRA brain- normal  -- MRI c-spine stable  -- lipid profile elevated but may be somewhat related to hypothyroidism  -- ASA daily  -- PT / OT / Speech evaluation    New onset hypothyroidism-TSH 9  -- Start Synthroid and he needs repeat TSH in 6 weeks     Elevated creatinine:  Last creatinine was 0.90 in 2015. -- repeat labs normal         Problem List:  Problem List as of 12/16/2018 Date Reviewed: 6/22/2015          Codes Class Noted - Resolved    * (Principal) Left-sided weakness ICD-10-CM: R53.1  ICD-9-CM: 728.87  12/14/2018 - Present        Cervical spine fracture ([facility] [address] ICD-10-CM: S12. 9XXA  ICD-9-CM: 805.00  6/20/2015 - Present              HPI:   Mr. Mukul Stanton is a 47 y.o.  male who is admitted with Left-sided weakness. Mr. Mukul Stanton presented to the Emergency Department today complaining of left sided weakness. Patient was at work today and while walking noticed his left leg felt weak, like it was going to give out at the knee. Also had some numbness and tingling of the left arm. This was associated with feeling dizzy. Was assisted back to his office and drank water w/o difficulty. Continued to have symptoms, so drove himself to the ED 1 hour away from work. No facial weakness, numbness, change in speech. No fever, chills. Denies sob or chest pain. Has hx of cervical spine fx s/p surgery. Since then reports the neck will lock up at times and has to use his hands on his head to unlock his neck. Patient reports doing this prior to the onset of the above symptoms and a louder than usual pop heard. Denies neck pains.  ( Gissel Christianson)    12/15: More dizziness this am. Less weakness in his leg. Worse with ambulation. Had to be very careful getting OOB. No headache. No dysphagia or choking. : MRI and MRA neg. TSH 9. A1c 6.2. Will start Synthroid and he will need repeat labs in 6 weeks. Ready for discharge. Review of Systems:   A comprehensive review of systems was negative except for that written in the HPI. Objective:   Physical Exam:     Visit Vitals  /62 (BP 1 Location: Left arm, BP Patient Position: At rest)   Pulse 62   Temp 98.2 °F (36.8 °C)   Resp 16   Ht 5' 9.5\" (1.765 m)   Wt 175 lb (79.4 kg)   SpO2 97%   BMI 25.47 kg/m²      O2 Device: Room air    Temp (24hrs), Av °F (36.7 °C), Min:97.4 °F (36.3 °C), Max:98.5 °F (36.9 °C)    No intake/output data recorded. No intake/output data recorded. General:  Alert, cooperative, no distress, appears stated age. Head:  Normocephalic, without obvious abnormality, atraumatic. Eyes:  Conjunctivae/corneas clear. PERRL, EOMs intact. Nose: Nares normal. Septum midline. Mucosa normal. No drainage or sinus tenderness. Throat: Lips, mucosa, and tongue normal. Teeth and gums normal.   Neck: Supple, symmetrical, trachea midline, no adenopathy, thyroid: no enlargement/tenderness/nodules, no carotid bruit and no JVD. Back:   Symmetric, no curvature. ROM normal. No CVA tenderness. Lungs:   Clear to auscultation bilaterally. Chest wall:  No tenderness or deformity. Heart:  Regular rate and rhythm, S1, S2 normal, no murmur, click, rub or gallop. Abdomen:   Soft, non-tender. Bowel sounds normal. No masses,  No organomegaly. Extremities: Extremities normal, atraumatic, no cyanosis or edema. No calf tenderness or cords. Pulses: 2+ and symmetric all extremities. Skin: Skin color, texture, turgor normal. No rashes or lesions   Neurologic: CNII-XII intact. Alert and oriented X 3.   Fine motor of hands and fingers normal.   equal.  No cogwheeling or rigidity. Gait not tested at this time. Sensation grossly normal to touch. Gross motor of extremities normal.       Data Review:   IMPRESSION: MRI C-spine     1. Status post anterior cervical fusion at C6-7.  2. Unchanged mild left neural foraminal narrowing at C2-3 and C5-6. IMPRESSION: Unremarkable MRI of the brain. Recent Days:  Recent Labs     12/14/18  1736   WBC 9.1   HGB 15.2   HCT 45.3        Recent Labs     12/15/18  0457 12/14/18  1736    139   K 4.0 4.0    102   CO2 23 27   * 105*   BUN 17 18   CREA 1.09 1.34*   CA 8.9 9.7       24 Hour Results:    Medications reviewed  Current Facility-Administered Medications   Medication Dose Route Frequency    calcium carbonate (TUMS) chewable tablet 400 mg [elemental]  400 mg Oral QID PRN    sodium chloride (NS) flush 5-10 mL  5-10 mL IntraVENous Q8H    sodium chloride (NS) flush 5-10 mL  5-10 mL IntraVENous PRN    ondansetron (ZOFRAN) injection 4 mg  4 mg IntraVENous Q6H PRN    aspirin tablet 325 mg  325 mg Oral DAILY    acetaminophen (TYLENOL) tablet 650 mg  650 mg Oral Q4H PRN    Or    acetaminophen (TYLENOL) solution 650 mg  650 mg Per NG tube Q4H PRN    Or    acetaminophen (TYLENOL) suppository 650 mg  650 mg Rectal Q4H PRN    enoxaparin (LOVENOX) injection 40 mg  40 mg SubCUTAneous Q24H    0.9% sodium chloride infusion  75 mL/hr IntraVENous CONTINUOUS       Care Plan discussed with: Patient/Family    Total time spent with patient and review of records: 30 minutes.     Haider Austin MD

## 2018-12-16 NOTE — PROGRESS NOTES
Neurology Hospital Progress Note    Patient ID:  Braulio Steven  [id number]  53 y.o.  1965      Subjective:   History:  Braulio Steven is a 47 y.o. male who  has a past medical history of GERD (gastroesophageal reflux disease), H/O cervical fracture, and History of shingles. who is admitted for L arm and L leg numbness. Patient is feeling better today. TSH was abnormal.      ROS:  Per HPI-  Otherwise the remainder of the review of system was negative      Social Hx:  Social History     Socioeconomic History    Marital status:      Spouse name: Not on file    Number of children: Not on file    Years of education: Not on file    Highest education level: Not on file   Tobacco Use    Smoking status: Former Smoker     Packs/day: 0.50     Years: 23.00     Pack years: 11.50     Last attempt to quit: 2016     Years since quittin.9    Smokeless tobacco: Never Used   Substance and Sexual Activity    Alcohol use: No    Drug use: No       Meds:  No current facility-administered medications on file prior to encounter. Current Outpatient Medications on File Prior to Encounter   Medication Sig Dispense Refill    omeprazole (PRILOSEC) 20 mg capsule Take 20 mg by mouth as needed.  CALCIUM CARBONATE (TUMS PO) Take  by mouth daily as needed.  HYDROcodone-acetaminophen (NORCO) 5-325 mg per tablet Take 1 Tab by mouth every four (4) hours as needed for Pain. Max Daily Amount: 6 Tabs. 40 Tab 0       Imaging:    CT Results (recent):  Results from Hospital Encounter encounter on 18   CT CODE NEURO HEAD WO CONTRAST    Narrative INDICATION:  ataxia, extremity weakness, dizziness. Code neuro. Left-sided  weakness and dizziness since 1500 hours today. Left lower extremity ataxia. EXAM: CT HEAD WITHOUT CONTRAST. COMPARISON: None. PROCEDURE: Sequential axial images of the head were performed without  intravenous contrast. Soft tissue and bone windows were examined.  Images were  reformatted in the sagittal and coronal planes. .CT dose reduction was achieved  through use of a standardized protocol tailored for this examination and  automatic exposure control for dose modulation. FINDINGS: The brain parenchyma and ventricular system are unremarkable in  appearance for age. There is no parenchymal mass or hemorrhage and no shift of  midline structures or extra-axial collection. No obvious acute ischemia. No bony  abnormality. Impression IMPRESSION: No acute intracranial abnormality. MRI Results (recent):  Results from [place] encounter on 12/14/18   MRI CERV SPINE WO CONT    Narrative EXAM: MRI CERV SPINE WO CONT    INDICATION: left sided weakness, popping from neck. hx of cervical spine fx. .    COMPARISON: 6/21/2015    TECHNIQUE: MR imaging of the cervical spine was performed using the following  sequences: sagittal T1, T2, STIR;  axial T2, T1.     CONTRAST:  None. FINDINGS:    The patient is status post anterior cervical fusion at C6-7. There is normal  alignment of the cervical spine. Vertebral body heights are maintained. Marrow  signal is normal.    The craniocervical junction is intact. The course, caliber, and signal intensity  of the spinal cord are normal.      The paraspinal soft tissues are within normal limits. C2-C3: Mild left neural foraminal narrowing. C3-C4: No herniation or stenosis. C4-C5: No herniation or stenosis. C5-C6: Mild left neural foraminal narrowing. C6-C7: Status post anterior cervical fusion. No herniation or stenosis. C7-T1: No herniation or stenosis. Impression IMPRESSION:    1. Status post anterior cervical fusion at C6-7.  2. Unchanged mild left neural foraminal narrowing at C2-3 and C5-6. IR Results (recent):  No results found for this or any previous visit. VAS/US Results (recent):  No results found for this or any previous visit.     Reviewed records in [employer] and [id number] tab today    Lab Review     Admission on 12/14/2018   Component Date Value Ref Range Status    Glucose (POC) 12/14/2018 96  65 - 100 mg/dL Final    Comment: (NOTE)  The Accu-Chek Inform II glucometer is not FDA cleared for critically   ill patient use. A study was performed validating the equivalence of   glucometer and clinical laboratory results on this patient   population. Despite the study, use of glucometers with capillary   specimens from critically ill patients, regardless of their location,   makes the test high complexity and requires the performing individual   to comply with CLIA requirements more stringent than those for waived   testing in the hospital setting. Critical thinking skills are   necessary to determine a potentially critically ill patients status   prior to using a glucometer.  Performed by 12/14/2018 Hattie Ochoa   Final    SAMPLES BEING HELD 12/14/2018 1 RED 1 BLUE   Final    COMMENT 12/14/2018 Add-on orders for these samples will be processed based on acceptable specimen integrity and analyte stability, which may vary by analyte. Final    WBC 12/14/2018 9.1  4.1 - 11.1 K/uL Final    RBC 12/14/2018 5.10  4. 10 - 5.70 M/uL Final    HGB 12/14/2018 15.2  12.1 - 17.0 g/dL Final    HCT 12/14/2018 45.3  36.6 - 50.3 % Final    MCV 12/14/2018 88.8  80.0 - 99.0 FL Final    MCH 12/14/2018 29.8  26.0 - 34.0 PG Final    MCHC 12/14/2018 33.6  30.0 - 36.5 g/dL Final    RDW 12/14/2018 12.4  11.5 - 14.5 % Final    PLATELET 44/10/0799 305  150 - 400 K/uL Final    MPV 12/14/2018 10.9  8.9 - 12.9 FL Final    NEUTROPHILS 12/14/2018 51  32 - 75 % Final    LYMPHOCYTES 12/14/2018 34  12 - 49 % Final    MONOCYTES 12/14/2018 11  5 - 13 % Final    EOSINOPHILS 12/14/2018 3  0 - 7 % Final    BASOPHILS 12/14/2018 1  0 - 1 % Final    ABS. NEUTROPHILS 12/14/2018 4.7  1.8 - 8.0 K/UL Final    ABS. LYMPHOCYTES 12/14/2018 3.1  K/UL Final    ABS.  MONOCYTES 12/14/2018 1.0  0.0 - 1.0 K/UL Final    ABS. EOSINOPHILS 12/14/2018 0.2  0.0 - 0.4 K/UL Final    ABS. BASOPHILS 12/14/2018 0.1  0.0 - 0.1 K/UL Final    DF 12/14/2018 AUTOMATED    Final    XXWBCSUS 12/14/2018 0    Final    Sodium 12/14/2018 139  136 - 145 mmol/L Final    Potassium 12/14/2018 4.0  3.5 - 5.1 mmol/L Final    Chloride 12/14/2018 102  97 - 108 mmol/L Final    CO2 12/14/2018 27  21 - 32 mmol/L Final    Anion gap 12/14/2018 10  5 - 15 mmol/L Final    Glucose 12/14/2018 105* 65 - 100 mg/dL Final    BUN 12/14/2018 18  6 - 20 MG/DL Final    Creatinine 12/14/2018 1.34* 0.70 - 1.30 MG/DL Final    BUN/Creatinine ratio 12/14/2018 13  12 - 20   Final    GFR est AA 12/14/2018 >60  >60 ml/min/1.73m2 Final    GFR est non-AA 12/14/2018 56* >60 ml/min/1.73m2 Final    Comment: Estimated GFR is calculated using the IDMS-traceable Modification of Diet in Renal Disease (MDRD) Study equation, reported for both  Americans (GFRAA) and non- Americans (GFRNA), and normalized to 1.73m2 body surface area. The physician must decide which value applies to the patient. The MDRD study equation should only be used in individuals age 25 or older. It has not been validated for the following: pregnant women, patients with serious comorbid conditions, or on certain medications, or persons with extremes of body size, muscle mass, or nutritional status.  Calcium 12/14/2018 9.7  8.5 - 10.1 MG/DL Final    Troponin-I, Qt. 12/14/2018 <0.05  <0.05 ng/mL Final    Comment: The presence of detectable troponin above the reference range indicates myocardial injury which may be due to ischemia, myocarditis, trauma, etc.  Clinical correlation is necessary to establish the significance of this finding. Sequential testing is recommended to determine if the typical rise and fall of cTnI is demonstrated. Note:  Cardiac troponin I has a relatively long half life and may be present well after the CK MB has returned to baseline.   The reference range is based on the 99th percentile of the referent population.  Color 12/14/2018 Light Yellow    Final    Color Reference Range: Straw, Yellow or Dark Yellow    Appearance 12/14/2018 CLEAR  CLEAR   Final    Specific gravity 12/14/2018 1.006  1.003 - 1.030   Final    pH (UA) 12/14/2018 6.0  5.0 - 8.0   Final    Protein 12/14/2018 NEGATIVE   NEG mg/dL Final    Glucose 12/14/2018 NEGATIVE   NEG mg/dL Final    Ketone 12/14/2018 NEGATIVE   NEG mg/dL Final    Bilirubin 12/14/2018 NEGATIVE   NEG   Final    Blood 12/14/2018 NEGATIVE   NEG   Final    Urobilinogen 12/14/2018 0.2  0.2 - 1.0 EU/dL Final    Nitrites 12/14/2018 NEGATIVE   NEG   Final    Leukocyte Esterase 12/14/2018 NEGATIVE   NEG   Final    WBC 12/14/2018 0-4  0 - 4 /hpf Final    RBC 12/14/2018 0-5  0 - 5 /hpf Final    Epithelial cells 12/14/2018 FEW  FEW /lpf Final    Epithelial cell category consists of squamous cells and /or transitional urothelial cells. Renal tubular cells, if present, are separately identified as such.  Bacteria 12/14/2018 NEGATIVE   NEG /hpf Final    Urine culture hold 12/14/2018 URINE ON HOLD IN MICROBIOLOGY DEPT FOR 3 DAYS. IF UNPRESERVED URINE IS SUBMITTED, IT CANNOT BE USED FOR ADDITIONAL TESTING AFTER 24 HRS, RECOLLECTION WILL BE REQUIRED. Final    LIPID PROFILE 12/15/2018        Final    Cholesterol, total 12/15/2018 211* <200 MG/DL Final    Triglyceride 12/15/2018 110  <150 MG/DL Final    Based on NCEP-ATP III:  Triglycerides <150 mg/dL  is considered normal, 150-199 mg/dL  borderline high,  200-499 mg/dL high and  greater than or equal to 500 mg/dL very high.  HDL Cholesterol 12/15/2018 42  MG/DL Final    Based on NCEP ATP III, HDL Cholesterol <40 mg/dL is considered low and >60 mg/dL is elevated.     LDL, calculated 12/15/2018 147* 0 - 100 MG/DL Final    Comment: Based on the NCEP-ATP: LDL-C concentrations are considered  optimal <100 mg/dL,  near optimal/above Normal 100-129 mg/dL  Borderline High: 130-159, High: 160-189 mg/dL  Very High: Greater than or equal to 190 mg/dL      VLDL, calculated 12/15/2018 22  MG/DL Final    CHOL/HDL Ratio 12/15/2018 5.0  0 - 5.0   Final    Hemoglobin A1c 12/15/2018 6.2  4.2 - 6.3 % Final    Est. average glucose 12/15/2018 131  mg/dL Final    Comment: (NOTE)  The eAG should be interpreted with patient characteristics in mind   since ethnicity, interindividual differences, red cell lifespan,   variation in rates of glycation, etc. may affect the validity of the   calculation.  Sodium 12/15/2018 137  136 - 145 mmol/L Final    Potassium 12/15/2018 4.0  3.5 - 5.1 mmol/L Final    Chloride 12/15/2018 104  97 - 108 mmol/L Final    CO2 12/15/2018 23  21 - 32 mmol/L Final    Anion gap 12/15/2018 10  5 - 15 mmol/L Final    Glucose 12/15/2018 108* 65 - 100 mg/dL Final    BUN 12/15/2018 17  6 - 20 MG/DL Final    Creatinine 12/15/2018 1.09  0.70 - 1.30 MG/DL Final    BUN/Creatinine ratio 12/15/2018 16  12 - 20   Final    GFR est AA 12/15/2018 >60  >60 ml/min/1.73m2 Final    GFR est non-AA 12/15/2018 >60  >60 ml/min/1.73m2 Final    Calcium 12/15/2018 8.9  8.5 - 10.1 MG/DL Final    T4, Free 12/15/2018 0.8  0.8 - 1.5 NG/DL Final    TSH 12/15/2018 9.71* 0.36 - 3.74 uIU/mL Final    Comment: (NOTE)  Due to TSH heterogeneity, both structurally and degree of   glycosylation, monoclonal antibodies used in the TSH assay may not   accurately quantitate TSH. Therefore, this result should be   correlated with clinical findings as well as with other assessments   of thyroid function, e.g., free T4, free T3. Objective:       Exam:  Visit Vitals  /62 (BP 1 Location: Left arm, BP Patient Position: At rest)   Pulse 62   Temp 98.2 °F (36.8 °C)   Resp 16   Ht 5' 9.5\" (1.765 m)   Wt 79.4 kg (175 lb)   SpO2 97%   BMI 25.47 kg/m²     Gen: Awake, alert, follows commands  Appropriate appearance, normal speech. Oriented to all spheres.   No visual field defect on confrontation exam.  Full eyes movement, with no nystagmus, no diplopia, no ptosis. Normal gag and swallow. All remaining cranial nerves were normal  Motor function: 5/5 in all extremities  Sensory: intact to LT, PP and JPS  DTRs + in all extremities, (-) Babinski  Good FTN and HTS   Gait: Deferred    Assessment:     1. Ataxia    2. Weakness    3. Left-sided weakness      Paresthesia can be related to hypothyroid      Plan:   1. I had a long discussion with patient  2. Reviewed MRI/MRA brain is unremarkable  3. Discussed MRI cervical spine only showed old C6C7 fusion; otherwise stable  4. Treat thyroid     Follow up with his neurosurgeon regarding \"popping' of his neck    Please call for questions    35 mins of time spent, 50% of which was spent on counseling and coordination of care.       Delphine Warner MD  Diplomate, American Board of Psychiatry and Neurology  Diplomate, Neuromuscular Medicine  Diplomate, American Board of Electrodiagnostic Medicine

## 2018-12-16 NOTE — PROGRESS NOTES
Bedside and Verbal shift change report given to Sanjana Marshall,  (oncoming nurse) by Theodore Castellon (offgoing nurse). Report included the following information SBAR.

## 2018-12-16 NOTE — PROGRESS NOTES
Discharge medications reviewed with patient and appropriate educational materials and side effects teaching were provided. I have reviewed discharge instructions with the patient. The patient verbalized understanding. Prescription for synthroid provided to patient.

## 2018-12-16 NOTE — DISCHARGE SUMMARY
Physician Discharge Summary     Patient ID:    Con Peterson  [id number]  10 y.o.  1965  Darlene Lin MD    Admit date: 12/14/2018    Discharge date and time: 12/16/2018    Admission Diagnoses: Left-sided weakness; Left-sided weakness    Chronic Diagnoses:    Problem List as of 12/16/2018 Date Reviewed: 6/22/2015          Codes Class Noted - Resolved    * (Principal) Left-sided weakness ICD-10-CM: R53.1  ICD-9-CM: 728.87  12/14/2018 - Present        Cervical spine fracture ([facility] [address] ICD-10-CM: S12. 9XXA  ICD-9-CM: 805.00  6/20/2015 - Present              Discharge Medications:   Current Discharge Medication List      START taking these medications    Details   aspirin (ASPIRIN) 325 mg tablet Take 1 Tab by mouth daily. Qty: 30 Tab, Refills: 0      levothyroxine (SYNTHROID) 50 mcg tablet Take 1 Tab by mouth Daily (before breakfast). Qty: 60 Tab, Refills: 0         CONTINUE these medications which have NOT CHANGED    Details   omeprazole (PRILOSEC) 20 mg capsule Take 20 mg by mouth as needed. CALCIUM CARBONATE (TUMS PO) Take  by mouth daily as needed. HYDROcodone-acetaminophen (NORCO) 5-325 mg per tablet Take 1 Tab by mouth every four (4) hours as needed for Pain. Max Daily Amount: 6 Tabs. Qty: 40 Tab, Refills: 0              Follow up Care:    1. Darlene Lin MD with in 1 weeks    Diet:  Diabetic Diet    Disposition:  Home.     Advanced Directive:    Discharge Exam:  [See today's progress note.]    CONSULTATIONS: Neurology    Significant Diagnostic Studies:   Recent Labs     12/14/18  1736   WBC 9.1   HGB 15.2   HCT 45.3        Recent Labs     12/15/18  0457 12/14/18  1736    139   K 4.0 4.0    102   CO2 23 27   BUN 17 18   CREA 1.09 1.34*   * 105*   CA 8.9 9.7       Lab Results   Component Value Date/Time    Glucose (POC) 96 12/14/2018 05:17 PM     Lab Results   Component Value Date/Time    TSH 9.71 (H) 12/15/2018 04:57 AM    T4, Free 0.8 12/15/2018 04:57 AM HOSPITAL COURSE & TREATMENT RENDERED:   Left-sided weakness (12/14/2018):  No hx of HTN, DM, XOL.  Quit tobacco 3 years ago.  No FH of CVA.  Less likely CVA or TIA. Joanna Diaz is concern that this could be cervical spine disease related.  Given both ASA and Plavix in ED.     -- neurology consultation  -- MRI / MRA brain- normal  -- MRI c-spine stable  -- lipid profile elevated but may be somewhat related to hypothyroidism  -- ASA daily  -- PT / OT / Speech evaluation     New onset hypothyroidism-TSH 9  -- Start Synthroid and he needs repeat TSH in 6 weeks     Elevated creatinine:  Last creatinine was 0.90 in 2015.    -- repeat labs normal              HPI:   Mr. Rodriguez is a 47 y. o.   male who is admitted with Left-sided weakness.  Mr. Rodriguez presented to the Emergency Department today complaining of left sided weakness.  Patient was at work today and while walking noticed his left leg felt weak, like it was going to give out at the knee.  Also had some numbness and tingling of the left arm.  This was associated with feeling dizzy.  Was assisted back to his office and drank water w/o difficulty.  Continued to have symptoms, so drove himself to the ED 1 hour away from work.  No facial weakness, numbness, change in speech.  No fever, chills.  Denies sob or chest pain.  Has hx of cervical spine fx s/p surgery.  Since then reports the neck will lock up at times and has to use his hands on his head to unlock his neck.  Patient reports doing this prior to the onset of the above symptoms and a louder than usual pop heard.  Denies neck pains. (Dr Montes Fell)     12/15: More dizziness this am. Less weakness in his leg. Worse with ambulation. Had to be very careful getting OOB. No headache. No dysphagia or choking.      12/16: MRI and MRA neg. TSH 9. A1c 6.2. Will start Synthroid and he will need repeat labs in 6 weeks.  Ready for discharge.      Review of Systems:   A comprehensive review of systems was negative except for that written in the HPI.     Objective:   Physical Exam:      Visit Vitals  /62 (BP 1 Location: Left arm, BP Patient Position: At rest)   Pulse 62   Temp 98.2 °F (36.8 °C)   Resp 16   Ht 5' 9.5\" (1.765 m)   Wt 175 lb (79.4 kg)   SpO2 97%   BMI 25.47 kg/m²      O2 Device: Room air     Temp (24hrs), Av °F (36.7 °C), Min:97.4 °F (36.3 °C), Max:98.5 °F (36.9 °C)    No intake/output data recorded. No intake/output data recorded.     General:  Alert, cooperative, no distress, appears stated age. Head:  Normocephalic, without obvious abnormality, atraumatic. Eyes:  Conjunctivae/corneas clear. PERRL, EOMs intact. Nose: Nares normal. Septum midline. Mucosa normal. No drainage or sinus tenderness. Throat: Lips, mucosa, and tongue normal. Teeth and gums normal.   Neck: Supple, symmetrical, trachea midline, no adenopathy, thyroid: no enlargement/tenderness/nodules, no carotid bruit and no JVD. Back:   Symmetric, no curvature. ROM normal. No CVA tenderness. Lungs:   Clear to auscultation bilaterally. Chest wall:  No tenderness or deformity. Heart:  Regular rate and rhythm, S1, S2 normal, no murmur, click, rub or gallop. Abdomen:   Soft, non-tender. Bowel sounds normal. No masses,  No organomegaly. Extremities: Extremities normal, atraumatic, no cyanosis or edema. No calf tenderness or cords. Pulses: 2+ and symmetric all extremities. Skin: Skin color, texture, turgor normal. No rashes or lesions   Neurologic: CNII-XII intact. Alert and oriented X 3. Fine motor of hands and fingers normal.   equal.  No cogwheeling or rigidity. Gait not tested at this time. Sensation grossly normal to touch. Gross motor of extremities normal.        Data Review:   IMPRESSION: MRI C-spine     1. Status post anterior cervical fusion at C6-7.  2. Unchanged mild left neural foraminal narrowing at C2-3 and C5-6.     IMPRESSION: Unremarkable MRI of the brain.   Recent Days:            Signed:  Danilo Roldan MD [place]  12/16/2018  7:35 AM

## 2018-12-16 NOTE — DISCHARGE INSTRUCTIONS
Patient Discharge Instructions    Obdulia Leblanc / [id number] : 1965    Admitted 2018 Discharged: 2018 7:35 AM     ACUTE DIAGNOSES:  Left-sided weakness  Left-sided weakness    CHRONIC MEDICAL DIAGNOSES:  Problem List as of 2018 Date Reviewed: 2015          Codes Class Noted - Resolved    * (Principal) Left-sided weakness ICD-10-CM: R53.1  ICD-9-CM: 728.87  2018 - Present        Cervical spine fracture ([facility] [address] ICD-10-CM: S12. 9XXA  ICD-9-CM: 805.00  2015 - Present              DISCHARGE MEDICATIONS:         · It is important that you take the medication exactly as they are prescribed. · Keep your medication in the bottles provided by the pharmacist and keep a list of the medication names, dosages, and times to be taken in your wallet. · Do not take other medications without consulting your doctor. DIET:  Diabetic Diet    ACTIVITY: Activity as tolerated    ADDITIONAL INFORMATION: If you experience any of the following symptoms then please call your primary care physician or return to the emergency room if you cannot get hold of your doctor: Fever, chills, nausea, vomiting, diarrhea, change in mentation, falling, bleeding, shortness of breath. FOLLOW UP CARE:  Dr. Darby Pittman MD  you are to call and set up an appointment to see them with in 1 week. Follow-up with specialists at directed by them      Information obtained by :  I understand that if any problems occur once I am at home I am to contact my physician. I understand and acknowledge receipt of the instructions indicated above.                                                                                                                                            Physician's or R.N.'s Signature                                                                  Date/Time Patient or Representative Signature                                                          Date/Time

## 2018-12-18 ENCOUNTER — PATIENT OUTREACH (OUTPATIENT)
Dept: OTHER | Age: 53
End: 2018-12-18

## 2018-12-18 NOTE — PROGRESS NOTES
Patient identified as eligible for [address] [place] [address] services. First telephone outreach attempted. Left discreet voicemail with this CM confidential contact information. Will attempt to reach patient for post hospital evaluation.

## 2018-12-18 NOTE — PROGRESS NOTES
Transition Of Care Note      Patient discharged from [facility] admitted on 12/15/18 and discharged on 18 for left sided weakness, new onset hypothyroidism. Mr. Rodriguez is a 47 y. o.   male who is admitted with Left-sided weakness.  Mr. Rodriguez presented to the Emergency Department today complaining of left sided weakness.  Patient was at work today and while walking noticed his left leg felt weak, like it was going to give out at the knee.  Also had some numbness and tingling of the left arm.  This was associated with feeling dizzy.  Was assisted back to his office and drank water w/o difficulty.  Continued to have symptoms, so drove himself to the ED 1 hour away from work.  No facial weakness, numbness, change in speech.  No fever, chills.  Denies sob or chest pain.  Has hx of cervical spine fx s/p surgery.  Since then reports the neck will lock up at times and has to use his hands on his head to unlock his neck.  Patient reports doing this prior to the onset of the above symptoms and a louder than usual pop heard.  Denies neck pains. (Dr Chelo Fink)         Medical History:     Past Medical History:   Diagnosis Date    GERD (gastroesophageal reflux disease)     H/O cervical fracture     History of shingles        Care Manager contacted the patient by telephone to perform post hospital discharge assessment. Verified  and zip code with patient as identifiers. Provided introduction to self, and explanation of the Nurse Care Manager role. Red Flags: If you experience any of the following symptoms then please call your primary care physician or return to the emergency room if you cannot get hold of your doctor: Fever, chills, nausea, vomiting, diarrhea, change in mentation, falling, bleeding, shortness of breath. Condition Focused Assessment:   Patient reports the following:   Neurological Condition Focused Assessment    Description of pain: dull pain.  - occasional left sided weakness with numbness. States he has popping in his neck and reduced mobility at times, not a new problem per patient. Associated symptoms: weakness. Have you recently had any of the following? Any recent changes in mood, thinking or new symptoms no  Any change in speech no  Difficulty swallowing no  Dizziness/lightheadedness no  Fatigue yes    Anxiety no  Confusion no   Sleep disturbance no     Visual changes no  Limb paralysis, or facial drooping no  Weakness yes  Trouble with coordinating your movement, or change in gait no  Medication changes? Yes - new medications - synthroid and aspirin  Any recent changes in activity no  Does patient have incentive spirometer? no  If yes, how frequently is patient using incentive spirometer? na  Mobility or assistive device in place no  DME needs addressed yes  PT/OT/ST ordered no    Medication:   New Medications at Discharge: synthroid, aspirin  Changed Medications at Discharge: none  Discontinued Medications at Discharge: none  Current Outpatient Medications   Medication Sig    levothyroxine (SYNTHROID) 50 mcg tablet Take 1 Tab by mouth Daily (before breakfast).  omeprazole (PRILOSEC) 20 mg capsule Take 20 mg by mouth as needed.  CALCIUM CARBONATE (TUMS PO) Take  by mouth daily as needed.  aspirin (ASPIRIN) 325 mg tablet Take 1 Tab by mouth daily.  HYDROcodone-acetaminophen (NORCO) 5-325 mg per tablet Take 1 Tab by mouth every four (4) hours as needed for Pain. Max Daily Amount: 6 Tabs. No current facility-administered medications for this visit. There are no discontinued medications. Performed medication reconciliation with patient, and patient verbalizes understanding of administration of home medications. There were no barriers to obtaining medications identified at this time. Inpatient RRAT score: not available  Was this a readmission?  no   Patient stated reason for the readmission: na    Barriers/Support system:  patient and wife    Home health/DME in place per discharge orders    Barriers/Challenges to Care: []  Decline in memory    []  Language barrier     []  Emotional                  []  Limited mobility  []  Lack of motivation     [] Vision, hearing or cognitive impairment [x]  Knowledge [] Financial Barriers []  Lack of support  [x]  Pain []  Other []  None    CM Identified  Problems   (contributing problems for risk for readmission)  1. Intermittent episodes of left sided weakness  2. New medications  3. New onset hypothyroidism      Discharge Instructions :  Reviewed discharge instructions with patient. Patient verbalizes understanding of discharge instructions and follow-up care. Advance Care Planning:   Patient was offered the opportunity to discuss advance care planning:  no     Does patient have an Advance Directive:  no   If no, did you provide information on Caring Connections?  no     PCP/Specialist follow up: Patient scheduled to follow up with Rere Marley MD on patient to schedule, states he is making an appointment for next week for follow up. Patient states he is also making an appointment with his neurologist at [facility] for follow up. Reviewed red flags with patient, and patient verbalizes understanding. Patient given an opportunity to ask questions. No other clinical/social/functional needs noted. The patient agrees to contact the PCP office for questions related to their healthcare. The patient expressed thanks, offered no additional questions and ended the call. PLAN:  MEHRAN follow up in 1-2 weeks. Pain? Numbness? MD follow up?

## 2018-12-27 ENCOUNTER — PATIENT OUTREACH (OUTPATIENT)
Dept: OTHER | Age: 53
End: 2018-12-27

## 2018-12-27 NOTE — PROGRESS NOTES
Subjective/Objective:  Outgoing call placed to patient for MEHRAN follow up, verified /zip code. Patient is post inpatient for left sided weakness, new onset hypothyroidism. Patient states he had a follow up with his PCP 3 days ago and is to have blood work completed at the end of January. States he is feeling better now but is going to follow up with a neurologist (Dr. Ember Brannon), he will make an appointment for follow up. Informed patient that I can assist with making appointment if needed, patient verbalized understanding. Follow up appointment with Dr. Vinayak Brenner on end . Assessment:     Neurological Condition Focused Assessment    Description of pain: none. Associated symptoms: na.  Have you recently had any of the following? Any recent changes in mood, thinking or new symptoms no  Any change in speech no  Difficulty swallowing no  Dizziness/lightheadedness no  Fatigue no    Anxiety no  Confusion no   Sleep disturbance no     Visual changes no  Limb paralysis, or facial drooping no  Weakness no  Trouble with coordinating your movement, or change in gait no  Medication changes? no  Any recent changes in activity no  Does patient have incentive spirometer? no  If yes, how frequently is patient using incentive spirometer? na  Mobility or assistive device in place no  DME needs addressed yes  PT/OT/ST ordered no**    Interventions:  Discussed continuing to take synthroid each morning on an empty stomach. Scheduling follow up with neurologist.    Monitoring symptoms. PLAN:  Continue MEHRAN follow up for post hospitalization for left sided weakness, new onset hypothyroidism support and resources. MEHRAN  follow up in 2 weeks. Weakness? Neuro follow up scheduled? Med side effects?

## 2019-01-10 ENCOUNTER — PATIENT OUTREACH (OUTPATIENT)
Dept: OTHER | Age: 54
End: 2019-01-10

## 2019-01-10 NOTE — PROGRESS NOTES
MEHRAN follow up. * Covering for CM Jany Correa RN. Contacted patient for transitions of care services. Verified  and address for HIPAA security. * Introduced myself as covering for his [facility] RN/ Jany Correa. Patient was not able to take my call at this time. Patient is at work unable to talk. Will inform CM.

## 2019-01-25 ENCOUNTER — PATIENT OUTREACH (OUTPATIENT)
Dept: OTHER | Age: 54
End: 2019-01-25

## 2019-01-25 NOTE — PROGRESS NOTES
Transfer from [facility] to [facility] episode due to need for continued support and resources related to hypothyroidism and c-spine issues. Subjective/Objective: Outgoing call placed to patient for [facility] follow up, verified /zip code. Patient is post inpatient hospitalization due to left sided weakness and new diagnosis of hypothyroidism. Patient states he needs to get his blood drawn per doctor Pinky Sanders - to evaluate thyroid dose. Patient voiced concern that he is gaining weight, he has not changed his eating habits. Encouraged patient to get blood drawn next week so that the doctor can evaluate his thyroid medication dose, discussed reporting his concerns to the doctor. Patient verbalized that he will notify the office staff when he goes to have blood drawn. Voiced concern that he continues to have neck discomfort with popping, has occurred since a neck injury 3 years ago. Has seen Dr. Dianna Whaley (neurologist) in the past and states he wants to follow up but doesn't know if he is in [facility]. This CM will find out about provider and inform patient. Follow up appointment with Dr. Britany Rodriguez after blood work results completed and in 6 months. Assessment: 
Orthopedic Condition Focused Assessment Skin- any open wounds or incisions? no 
Description and location of wound- na 
Have you recently had any of the following? Any recent changes in activity no Does patient have incentive spirometer? no If yes, how frequently is patient using incentive spirometer? na 
Mobility or assistive device in place no DME needs addressed yes PT/OT/ST ordered no Pain rated as 2/10 to 3 described as ache 
Numbness or tingling no Weakness no Trouble with coordinating your movement, or change in gait no New or worsening pain to calf, back of knee or groin no Redness, swelling to leg or groin no Fever no Nausea no Vomiting no Chills or clammy skin no Change in urine output no Recent change in urinary or bowel continence no Change in speech no Difficulty swallowing no Dizziness/lightheadedness no Sleep disturbance no Visual changes no Medication changes? no Interventions: 
Discussed follow up with PCP regarding concerns with weight gain and thyroid medication dosing. Discussed synthroid medication and hypothyroidism monitoring. PLAN: 
Continue CCM follow up for hypothyroidism and c-spine issues support and resources. CCM  follow up in 2 weeks. Neck discomfort - neuro follow up? Blood drawn? meds adjusted?

## 2019-02-08 ENCOUNTER — PATIENT OUTREACH (OUTPATIENT)
Dept: OTHER | Age: 54
End: 2019-02-08

## 2019-02-08 NOTE — PROGRESS NOTES
Outgoing call placed to patient for CCM follow up. Unable to reach patient at this time. Voice message left requesting return call to this Care Manager. Will continue to attempt to contact patient for ECM follow up.

## 2019-02-22 ENCOUNTER — PATIENT OUTREACH (OUTPATIENT)
Dept: OTHER | Age: 54
End: 2019-02-22

## 2019-02-22 NOTE — PROGRESS NOTES
Subjective/Objective: Incoming call from patient for CCM follow up, verified /zip code. Patient is post new onset of hypothyroidism, left sided weakness, hixtory of C-spine fracture form fall in 2015. [place] Patient states he is having intermittent \"clicking\" of his neck causing it to \"catch\", states he has to work with his neck to be able to move it normally again, it occurs frequently some days and not at all some days. States he is scared at times that it will cause him increased discomfort or reduced mobility. Discussed follow up with neurologist as possible avenue for treatment. Instructed patient to contact Dr. Monica Guan (PCP) for a recommendation for treatment. Patient verbalized that he picked up his refill for thyroid medication and it was name brand only, states cost was 3 times more than previous generic Rx. He has taken generic Rx until now and he states his blood work was good according to his PCP. Encouraged patient to contact his PCP for clarification that he needed the name brand versus the generic equivalent. He verbalized understanding. Follow up appointment with Dr. Monica Guan (PCP) in 6 months. Assessment: 
 Orthopedic Condition Focused Assessment Skin- any open wounds or incisions? no 
Description and location of wound- na 
Have you recently had any of the following? Any recent changes in activity no Does patient have incentive spirometer? no If yes, how frequently is patient using incentive spirometer? na 
Mobility or assistive device in place no DME needs addressed yes PT/OT/ST ordered no Pain rated as 3/10 to 4 described as intermittent neck \"popping, clicking\" Numbness or tingling no Weakness no Trouble with coordinating your movement, or change in gait no New or worsening pain to calf, back of knee or groin no Redness, swelling to leg or groin no Fever no Nausea no Vomiting no Chills or clammy skin no Change in urine output no Recent change in urinary or bowel continence no Change in speech no Difficulty swallowing no Dizziness/lightheadedness no Sleep disturbance no Visual changes no Medication changes? no Interventions: 
Discussed current symptoms with neck, encouraged to contact his PCP. Discussed thyroid medication. Discussed potential follow up with neurologist. 
 
 
PLAN: 
Continue CCM follow up for post ED, new hypothyroid support and resources. CCM  follow up in 3 weeks. Referral from PCP for neuro? Neck clicking? Thyroid med?

## 2019-03-22 ENCOUNTER — PATIENT OUTREACH (OUTPATIENT)
Dept: OTHER | Age: 54
End: 2019-03-22

## 2020-06-23 ENCOUNTER — HOSPITAL ENCOUNTER (EMERGENCY)
Age: 55
Discharge: HOME | End: 2020-06-23
Attending: EMERGENCY MEDICINE
Payer: COMMERCIAL

## 2020-06-23 ENCOUNTER — NURSE TRIAGE (OUTPATIENT)
Dept: OTHER | Facility: CLINIC | Age: 55
End: 2020-06-23

## 2020-06-23 VITALS
HEART RATE: 71 BPM | OXYGEN SATURATION: 96 % | SYSTOLIC BLOOD PRESSURE: 147 MMHG | TEMPERATURE: 98.9 F | BODY MASS INDEX: 26.98 KG/M2 | RESPIRATION RATE: 14 BRPM | WEIGHT: 188.05 LBS | DIASTOLIC BLOOD PRESSURE: 91 MMHG

## 2020-06-23 DIAGNOSIS — S60.410A ABRASION OF RIGHT INDEX FINGER, INITIAL ENCOUNTER: Primary | ICD-10-CM

## 2020-06-23 PROCEDURE — 75810000054 HC ARTHOCENTISIS JOINT

## 2020-06-23 PROCEDURE — 99282 EMERGENCY DEPT VISIT SF MDM: CPT

## 2020-06-23 NOTE — TELEPHONE ENCOUNTER
Reason for Disposition   [1] Cut AND [2] finger joint can't be opened (straightened) or closed (bent) completely    Answer Assessment - Initial Assessment Questions  1. MECHANISM: \"How did the injury happen? \"       Efra coated piece of metal   2. ONSET: \"When did the injury happen? \" (Minutes or hours ago)       Week ago  3. LOCATION: \"What part of the finger is injured? \" \"Is the nail damaged? \"       Middle knuckle  4. APPEARANCE of the INJURY: \"What does the injury look like? \"       Swollen, red, tender to touch   5. SEVERITY: \"Can you use the hand normally? \"  \"Can you bend your fingers into a ball and then fully open them? \"      Can bend it   6. SIZE: For cuts, bruises, or swelling, ask: \"How large is it? \" (e.g., inches or centimeters;  entire finger)       Originally 3/8 long but deep  But now cut is healed but busted back open   7. PAIN: \"Is there pain? \" If so, ask: \"How bad is the pain? \"    (e.g., Scale 1-10; or mild, moderate, severe)      10/10  8. TETANUS: For any breaks in the skin, ask: \"When was the last tetanus booster? \"      Unsure   9. OTHER SYMPTOMS: \"Do you have any other symptoms? \"      Denies   10. PREGNANCY: \"Is there any chance you are pregnant? \" \"When was your last menstrual period? \"        NA    Protocols used: FINGER INJURY-ADULT-        Patient triaged using appropriate protocol. Care advice given per protocol. Patient/Caregiver verbalized understanding of care advice and disposition.

## 2020-06-24 ENCOUNTER — PATIENT OUTREACH (OUTPATIENT)
Dept: OTHER | Age: 55
End: 2020-06-24

## 2020-06-24 NOTE — ED NOTES
I have reviewed discharge instructions with the patient. The patient verbalized understanding. All questions answered and follow up instructions given.

## 2020-06-24 NOTE — ED PROVIDER NOTES
Patient is a 25-year-old male who lacerated the PIP joint of his left next finger a week ago presents with worsening pain at the injured joint. Patient reports that he cleaned the wound and reapproximated the skin and that it was healing well until he hit the injured medical, at which point his pain worsened and he had a small abrasion on the healing wound. Patient also has some swelling at that joint that concerned him for an infection. Patient denies tenderness, fevers, decreased range of motion. The history is provided by the patient. Hand Pain    This is a new problem. The current episode started more than 1 week ago. Progression since onset: was improving but worsened after hitting the injured knuckle. The pain is mild. Pertinent negatives include full range of motion. Exacerbated by: a  blow.         Past Medical History:   Diagnosis Date    GERD (gastroesophageal reflux disease)     H/O cervical fracture     History of shingles        Past Surgical History:   Procedure Laterality Date    ABDOMEN SURGERY PROC UNLISTED Right     IHR age 23   [employer] KNEE ARTHROSCOPY Left 2000,2007    2nd scope became open surgery    HX KNEE ARTHROSCOPY Right     age 13    HX ORTHOPAEDIC  06/2015    ACDF C6-7 after a fall    HX VASECTOMY           Family History:   Problem Relation Age of Onset    Other Mother         AAA rupture    Hypertension Mother     Cancer Father         pancreatic, prostate    Alcohol abuse Sister     Thyroid Disease Sister        Social History     Socioeconomic History    Marital status:      Spouse name: Not on file    Number of children: Not on file    Years of education: Not on file    Highest education level: Not on file   Occupational History    Not on file   Social Needs    Financial resource strain: Not on file    Food insecurity     Worry: Not on file     Inability: Not on file    Transportation needs     Medical: Not on file     Non-medical: Not on file   Tobacco Use    Smoking status: Former Smoker     Packs/day: 0.50     Years: 23.00     Pack years: 11.50     Last attempt to quit: 12/24/2016     Years since quitting: 3.4    Smokeless tobacco: Never Used   Substance and Sexual Activity    Alcohol use: No    Drug use: No    Sexual activity: Not on file   Lifestyle    Physical activity     Days per week: Not on file     Minutes per session: Not on file    Stress: Not on file   Relationships    Social connections     Talks on phone: Not on file     Gets together: Not on file     Attends Zoroastrian service: Not on file     Active member of club or organization: Not on file     Attends meetings of clubs or organizations: Not on file     Relationship status: Not on file    Intimate partner violence     Fear of current or ex partner: Not on file     Emotionally abused: Not on file     Physically abused: Not on file     Forced sexual activity: Not on file   Other Topics Concern    Not on file   Social History Narrative    Not on file         ALLERGIES: Patient has no known allergies. Review of Systems   Constitutional: Negative for chills and fever. Respiratory: Negative for shortness of breath. Cardiovascular: Negative for chest pain. Gastrointestinal: Negative for abdominal pain, constipation, diarrhea and vomiting. Musculoskeletal: Positive for joint swelling. Skin: Positive for wound. Neurological: Negative for dizziness and light-headedness. All other systems reviewed and are negative. Vitals:    06/23/20 2030   BP: (!) 147/91   Pulse: 71   Resp: 14   Temp: 98.9 °F (37.2 °C)   SpO2: 96%   Weight: 85.3 kg (188 lb 0.8 oz)            Physical Exam  Vitals signs and nursing note reviewed. Constitutional:       Appearance: He is well-developed. HENT:      Head: Normocephalic and atraumatic. Eyes:      General: No scleral icterus. Neck:      Musculoskeletal: Normal range of motion. Cardiovascular:      Rate and Rhythm: Normal rate. Pulmonary:      Effort: Pulmonary effort is normal.   Abdominal:      General: There is no distension. Musculoskeletal:      Comments: PIP joint of index finger has swelling and fluctuance without induration, erythema, tenderness, decreased range of motion, or warmth   Skin:     General: Skin is warm and dry. Findings: No erythema or rash. Neurological:      Mental Status: He is alert and oriented to person, place, and time. MDM  Number of Diagnoses or Management Options  Abrasion of right index finger, initial encounter: new and requires workup         Arthrocentesis  Date/Time: 6/23/2020 8:39 PM  Performed by: Cece Esteban MD  Authorized by: Cece Esteban MD     Consent:     Consent obtained:  Verbal    Consent given by:  Patient    Risks discussed:  Bleeding, incomplete drainage and pain    Alternatives discussed:  No treatment  Location:     Location:  Finger    Finger:  Index finger    Index finger joint:  R index PIP  Anesthesia (see MAR for exact dosages): Anesthesia method:  None  Procedure details:     Preparation: Patient was prepped and draped in usual sterile fashion      Needle gauge:  25 G    Ultrasound guidance: no      Approach:  Lateral    Aspirate amount:  Minimal    Aspirate characteristics:  Clear  Post-procedure details:     Dressing:  Gauze roll    Patient tolerance of procedure: Tolerated well, no immediate complications        Pt presents with an extremity injury. No evidence of fracture, dislocation, or other significant musculoskeletal injury. No evidence of septic joint or cellulitis on exam and patient is neurovascularly in tact. Patient was discharged home with a plan for pain control as well as instructions on managing his injuries and precautions for returning to the emergency department. No evidence of compartment syndrome on evaluation. Patient will be discharged home to follow-up with primary care provider as instructed in discharge paperwork. Patient and family expressed understanding and agreed with plan.

## 2020-06-24 NOTE — DISCHARGE INSTRUCTIONS
Patient Education        Skin Tears: Care Instructions  Your Care Instructions  As we get older, our skin gets drier and more fragile. Sometimes this can cause the outer layers of skin to split and tear open. Skin tears are treated in different ways. In some cases, doctors use pieces of tape called Steri-Strips to pull the skin together and help it heal. Other times, it's best to leave the tear open and cover it with a special wound-care bandage. Skin tears are usually not serious. They usually heal in a few weeks. But how long you take to heal depends on your body and the type of tear you have. Sometimes the torn piece of skin is used to protect the wound while it heals. But that piece of skin does not heal. It may fall off on its own. Or the doctor may remove it. As your tear heals, it's important to keep it clean to help prevent infection. The doctor has checked you carefully, but problems can develop later. If you notice any problems or new symptoms, get medical treatment right away. Follow-up care is a key part of your treatment and safety. Be sure to make and go to all appointments, and call your doctor if you are having problems. It's also a good idea to know your test results and keep a list of the medicines you take. How can you care for yourself at home? · If you have pain, ask your doctor if you can take an over-the-counter pain medicine, such as acetaminophen (Tylenol), ibuprofen (Advil, Motrin), or naproxen (Aleve). Be safe with medicines. Read and follow all instructions on the label. · If you have a bandage, follow your doctor's instructions for changing it. · If you have Steri-Strips, leave them on until they fall off. · Follow your doctor's instructions about bathing. · Gently wash the skin tear with plain water 2 times a day. Do not rub the area. · Let the area air dry. Or you can pat it carefully with a soft towel. When should you call for help?    Call your doctor now or seek immediate medical care if:  · You have signs of infection, such as:  ? Increased pain, swelling, warmth, or redness around the tear. ? Red streaks leading from the tear. ? Pus draining from the tear. ? A fever. · The tear starts to bleed a lot. Small amounts of blood are normal.  Watch closely for changes in your health, and be sure to contact your doctor if:  · You do not get better as expected. Where can you learn more? Go to http://qian-ankush.info/  Enter R751 in the search box to learn more about \"Skin Tears: Care Instructions. \"  Current as of: June 26, 2019               Content Version: 12.5  © 7224-8315 [employer]. Care instructions adapted under license by [employer] (which disclaims liability or warranty for this information). If you have questions about a medical condition or this instruction, always ask your healthcare professional. [address] any warranty or liability for your use of this information.

## 2020-06-24 NOTE — ED TRIAGE NOTES
Triage note:  Pt arrived with c/o laceration to pointer finger left hand. Pt stated he accidentally cut this finger 1 week ago and is concerned with infection. Pt denies fever. No drainage noted to laceration.

## 2020-06-25 ENCOUNTER — PATIENT OUTREACH (OUTPATIENT)
Dept: OTHER | Age: 55
End: 2020-06-25

## 2020-06-25 NOTE — LETTER
Mr. Dania Mcfarland [address] [facility] [address] [id number] Dear Emile Kwong , 
 
My name is Nadir Montaño LPN, Employee Care Manager for [facility] and I have been trying to reach you, for follow up after your recent ER visit. The Employee Care Management [facility]) program is a free-of-charge confidential service provided to our employees and their family members covered by the [facility]. The program will provide an employee and his/her family with the [facility]' expertise to assist in navigating the health care delivery system, provider services, and their overall care needsso as to assure and improve health care interactions and enhance the quality of life. This program is designed to provide you with the opportunity to have a [facility] care manager partner with you for the following services: 
 
 1) when you come home from the hospital or emergency room 2) when help is needed to manage your disease 3) when you need assistance coordinating services or appointments [facility] is dedicated to empowering the good health of its community and improving the quality of care and care experiences for employees and their families. We are committed to safeguarding patient confidentiality and privacy, assuring that every employee has the respect he or she deserves in managing their health. The information shared with your care manager will not be shared with anyone else aside from those you identify as part of your care team, and will only be used to assist you with any identified care needs. Please contact me if you would like this service provided to you. Sincerely, Nadir Montaño LPN  [facility] Care Coordinator [address] [place] [address], [address] [place] [address] [address] [facility] Office Cell [phone] Fax [email or web address] [email or web address]

## 2020-06-25 NOTE — PROGRESS NOTES
Patient identified as eligible for [address] [place] [address] services. Second telephone outreach attempted. Left discreet voicemail with this CM confidential contact information. Will send UTR letter.

## 2020-07-18 LAB
CHOLEST SERPL-MCNC: 200 MG/DL
GLUCOSE SERPL-MCNC: 87 MG/DL (ref 65–100)
HDLC SERPL-MCNC: 41 MG/DL
LDLC SERPL CALC-MCNC: 120.2 MG/DL (ref 0–100)
TRIGL SERPL-MCNC: 194 MG/DL (ref ?–150)

## 2020-07-24 ENCOUNTER — PATIENT OUTREACH (OUTPATIENT)
Dept: OTHER | Age: 55
End: 2020-07-24

## 2021-06-29 ENCOUNTER — NURSE TRIAGE (OUTPATIENT)
Dept: OTHER | Facility: CLINIC | Age: 56
End: 2021-06-29

## 2021-06-29 ENCOUNTER — APPOINTMENT (OUTPATIENT)
Dept: GENERAL RADIOLOGY | Age: 56
End: 2021-06-29
Attending: EMERGENCY MEDICINE
Payer: COMMERCIAL

## 2021-06-29 ENCOUNTER — HOSPITAL ENCOUNTER (EMERGENCY)
Age: 56
Discharge: HOME | End: 2021-06-29
Attending: EMERGENCY MEDICINE
Payer: COMMERCIAL

## 2021-06-29 VITALS
OXYGEN SATURATION: 99 % | HEIGHT: 69 IN | WEIGHT: 177.25 LBS | SYSTOLIC BLOOD PRESSURE: 127 MMHG | BODY MASS INDEX: 26.25 KG/M2 | RESPIRATION RATE: 18 BRPM | HEART RATE: 80 BPM | DIASTOLIC BLOOD PRESSURE: 82 MMHG | TEMPERATURE: 98.2 F

## 2021-06-29 DIAGNOSIS — S61.012A THUMB LACERATION, LEFT, INITIAL ENCOUNTER: Primary | ICD-10-CM

## 2021-06-29 PROCEDURE — 74011250637 HC RX REV CODE- 250/637: Performed by: EMERGENCY MEDICINE

## 2021-06-29 PROCEDURE — 73130 X-RAY EXAM OF HAND: CPT

## 2021-06-29 PROCEDURE — 99283 EMERGENCY DEPT VISIT LOW MDM: CPT

## 2021-06-29 RX ORDER — IBUPROFEN 800 MG/1
800 TABLET ORAL
Qty: 20 TABLET | Refills: 0 | Status: SHIPPED | OUTPATIENT
Start: 2021-06-29 | End: 2021-07-06

## 2021-06-29 RX ORDER — CEPHALEXIN 500 MG/1
500 CAPSULE ORAL 4 TIMES DAILY
Qty: 28 CAPSULE | Refills: 0 | Status: SHIPPED | OUTPATIENT
Start: 2021-06-29 | End: 2021-07-06

## 2021-06-29 RX ORDER — IBUPROFEN 800 MG/1
800 TABLET ORAL
Status: COMPLETED | OUTPATIENT
Start: 2021-06-29 | End: 2021-06-29

## 2021-06-29 RX ADMIN — IBUPROFEN 800 MG: 800 TABLET, FILM COATED ORAL at 14:42

## 2021-06-29 NOTE — ED NOTES
Xray complete. Dr. Craig Emperor in to discuss plan of care and discharge instructions. I have reviewed discharge instructions with the patient. The patient verbalized understanding.

## 2021-06-29 NOTE — ED TRIAGE NOTES
Patient c/o left thumb pain after cutting thumb with  on Saturday night. Arrives today with complaints of numbness, swelling, and concern for tendon damage states patient.

## 2021-06-29 NOTE — TELEPHONE ENCOUNTER
Reason for Disposition   Skin is split open or gaping (length > 1/2 inch or 12 mm)    Answer Assessment - Initial Assessment Questions  1. MECHANISM: \"How did the injury happen? \"       Working with /slicer and cut thumb. Believe tendon is cut.    2. ONSET: \"When did the injury happen? \" (Minutes or hours ago)       Saturday night    3. LOCATION: \"What part of the finger is injured? \" \"Is the nail damaged? \"       First knuckle after thumb nail on left hand. No damage to nail. 4. APPEARANCE of the INJURY: \"What does the injury look like? \"       Now parallel cut at the knuckle. Also knot at the base of the thumb (palm side)    5. SEVERITY: \"Can you use the hand normally? \"  \"Can you bend your fingers into a ball and then fully open them? \"      Before started healing could move both ways with muscles. Now hurts too bad to try to bend. 6. SIZE: For cuts, bruises, or swelling, ask: \"How large is it? \" (e.g., inches or centimeters;  entire finger)       Cut is 3/4 inch, knot is 1/2 inch diameter     7. PAIN: \"Is there pain? \" If so, ask: \"How bad is the pain? \"    (e.g., Scale 1-10; or mild, moderate, severe)      10 when trying to bend, almost 0 when not moving. 8. TETANUS: For any breaks in the skin, ask: \"When was the last tetanus booster? \"      W/i last 5 years     9. OTHER SYMPTOMS: \"Do you have any other symptoms? \"      Numbness in tip, swollen around cut and little red    10. PREGNANCY: \"Is there any chance you are pregnant? \" \"When was your last menstrual period? \"        No    Protocols used: FINGER INJURY-ADULT-OH    Brief description of triage: see above    Triage indicates for patient to go to ED now. Care advice provided, patient verbalizes understanding; denies any other questions or concerns; instructed to call back for any new or worsening symptoms. Attention Provider: Thank you for allowing me to participate in the care of your patient.   The patient was connected to triage in response to symptoms provided. Please do not respond through this encounter as the response is not directed to a shared pool.

## 2021-06-30 ENCOUNTER — PATIENT OUTREACH (OUTPATIENT)
Dept: OTHER | Age: 56
End: 2021-06-30

## 2021-06-30 NOTE — ED PROVIDER NOTES
52-year-old male presents with a left thumb laceration. Several days ago, the patient was using a  when he cut the dorsal aspect of his left thumb. He states that he was able to flex and extend the thumb at that time. He has had some swelling and erythema developing around the wound. His tetanus is up-to-date. He states that it is painful to flex and extend the thumb at this point. He has noticed some swelling at the base of the thumb and is concerned that he may have lacerated his tendon.            Past Medical History:   Diagnosis Date    GERD (gastroesophageal reflux disease)     H/O cervical fracture     History of shingles        Past Surgical History:   Procedure Laterality Date    HX KNEE ARTHROSCOPY Left ,    2nd scope became open surgery    HX KNEE ARTHROSCOPY Right     age 13    HX ORTHOPAEDIC  2015    ACDF C6-7 after a fall    HX VASECTOMY      TX ABDOMEN SURGERY PROC UNLISTED Right     IHR age 23         Family History:   Problem Relation Age of Onset    Other Mother         AAA rupture    Hypertension Mother     Cancer Father         pancreatic, prostate    Alcohol abuse Sister     Thyroid Disease Sister        Social History     Socioeconomic History    Marital status:      Spouse name: Not on file    Number of children: Not on file    Years of education: Not on file    Highest education level: Not on file   Occupational History    Not on file   Tobacco Use    Smoking status: Former Smoker     Packs/day: 0.50     Years: 23.00     Pack years: 11.50     Quit date: 2016     Years since quittin.5    Smokeless tobacco: Never Used   Substance and Sexual Activity    Alcohol use: No    Drug use: No    Sexual activity: Not on file   Other Topics Concern    Not on file   Social History Narrative    Not on file     Social Determinants of Health     Financial Resource Strain:     Difficulty of Paying Living Expenses:    Food Insecurity:     Worried About [address] [facility] in the Last Year:    [address] in the Last Year:    Transportation Needs:     Lack of Transportation (Medical):  Lack of Transportation (Non-Medical):    Physical Activity:     Days of Exercise per Week:     Minutes of Exercise per Session:    Stress:     Feeling of Stress :    Social Connections:     Frequency of Communication with Friends and Family:     Frequency of Social Gatherings with Friends and Family:     Attends Faith Services:     Active Member of Clubs or Organizations:     Attends Club or Organization Meetings:     Marital Status:    Intimate Partner Violence:     Fear of Current or Ex-Partner:     Emotionally Abused:     Physically Abused:     Sexually Abused: ALLERGIES: Patient has no known allergies. Review of Systems   Constitutional: Negative for fever. HENT: Negative for rhinorrhea. Respiratory: Negative for cough. Cardiovascular: Negative for chest pain. Gastrointestinal: Negative for abdominal pain. Genitourinary: Negative for dysuria. Musculoskeletal: Negative for back pain. Skin: Positive for wound. Neurological: Negative for headaches. Psychiatric/Behavioral: Negative for confusion. Vitals:    06/29/21 1420 06/29/21 1436   BP: 127/82    Pulse: 80    Resp: 18    Temp: 98.2 °F (36.8 °C)    SpO2: 99% 99%   Weight: 80.4 kg (177 lb 4 oz)    Height: 5' 9\" (1.753 m)             Physical Exam  Vitals and nursing note reviewed. Constitutional:       General: He is not in acute distress. Appearance: Normal appearance. He is not ill-appearing, toxic-appearing or diaphoretic. HENT:      Head: Normocephalic. Eyes:      Extraocular Movements: Extraocular movements intact. Cardiovascular:      Rate and Rhythm: Normal rate. Pulses: Normal pulses. Pulmonary:      Effort: Pulmonary effort is normal. No respiratory distress. Abdominal:      General: There is no distension. Musculoskeletal:         General: Normal range of motion. Cervical back: Normal range of motion. Comments: Patient holds the left thumb in full extension. He is able to slightly flex the thumb. Skin:     General: Skin is dry. Capillary Refill: Capillary refill takes less than 2 seconds. Comments: Fusiform swelling of the thumb with a laceration on the dorsal aspect overlying the interphalangeal joint. Neurological:      Mental Status: He is alert and oriented to person, place, and time. Psychiatric:         Mood and Affect: Mood normal.                    MDM  Number of Diagnoses or Management Options  Thumb laceration, left, initial encounter  Diagnosis management comments: Patient presents with a left thumb laceration. He holds the joint in full extension and is able to slightly flex the thumb. Tendon injury is extremely unlikely given the patient's physical exam findings. An x-ray was performed to evaluate for foreign body or fracture. Plain film x-ray of the hand shows no fracture or foreign body of the left thumb. Again the patient holds the thumb in full extension and is very unlikely that he has lacerated his tendon; however, I do feel that antibiotics are indicated as he does have some fusiform swelling of the thumb with surrounding erythema of the laceration. I will refer him to hand surgery for follow-up. His tetanus is up-to-date. Discussed my clinical impression(s), any labs and/or radiology results with the patient. I answered any questions and addressed any concerns. Discussed the importance of following up with their primary care physician and/or specialist(s). Discussed signs or symptoms that would warrant return back to the ER for further evaluation. The patient is agreeable with discharge.        Amount and/or Complexity of Data Reviewed  Tests in the radiology section of CPT®: ordered and reviewed           Procedures

## 2021-06-30 NOTE — PROGRESS NOTES
Initial HPRP:   Patient on report as discharged from [facility] ED Visit 6/29/21 for Left Thumb Laceration. Initial attempt to contact patient for transitions of care.  Left discreet message on voicemail with this Care Coordinator's contact information.  Will attempt outreach on 7/1/21.      cephALEXin 500 mg capsule  ibuprofen 800 mg tablet    Call your doctor now or seek immediate medical care if:  You have signs of infection, such as: Increased pain, swelling, warmth, or redness near the wound. Red streaks leading from the wound. Pus draining from the wound. A fever. You bleed so much from your incision that you soak one or more bandages over 2 to 4 hours. The wound is not getting better each day.

## 2021-07-01 ENCOUNTER — PATIENT OUTREACH (OUTPATIENT)
Dept: OTHER | Age: 56
End: 2021-07-01

## 2021-07-01 NOTE — PROGRESS NOTES
Patient identified as eligible for [address] [place] [address] services. Second telephone outreach attempted. Left discreet voicemail with this CM confidential contact information. Will send UTR letter via Mail. Next Outreach 7/22/21 - f/u - ED Visit.

## 2021-07-01 NOTE — LETTER
7/1/2021 8:45 AM    Mr. Pema Greene  [address] [facility] [address]    Dear Pema Greene    My name is Bryanna Baez, Associate Care Coordinator for [employer] and I have been trying to reach you. The Associate Care Management (ACM) program is a free-of-charge confidential service provided to our associates and their family members covered by the [facility]. The program will provide an associate and his/her family with the [facility] expertise to assist in navigating the health care delivery system, provider services, and their overall care needsso as to assure and improve health care interactions and enhance the quality of life. This program is designed to provide you with the opportunity to have a [employer] partner with you for the following services:     1) when you come home from the hospital or emergency room   2) when help is needed to manage your disease   3) when you need assistance coordinating services or appointments  4) when you need additional education, resources or assistance reaching your Be Well Health Program goals/requirements such as Be Well With Diabetes      [facility] is dedicated to empowering the good health of its community and improving the quality of care and care experiences for associates and their families. We are committed to safeguarding patient confidentiality and privacy, assuring that every associate has the respect he or she deserves in managing their health. The information shared with your care manager will not be shared with anyone else aside from those you identify as part of your care team, and will only be used to assist you with any identified care needs. Please contact me if you would like this service provided to you.        Sincerely,    Shalini Matias LPN  [facility] Care Coordinator  Associate Care Management  [address] [place] [address] [address]  [address]  C [phone]  F [phone]  [email or web address]

## 2021-07-21 ENCOUNTER — PATIENT OUTREACH (OUTPATIENT)
Dept: OTHER | Age: 56
End: 2021-07-21

## 2022-03-19 PROBLEM — R53.1 LEFT-SIDED WEAKNESS: Status: ACTIVE | Noted: 2018-12-14

## 2023-05-19 RX ORDER — HYDROCODONE BITARTRATE AND ACETAMINOPHEN 5; 325 MG/1; MG/1
1 TABLET ORAL EVERY 4 HOURS PRN
COMMUNITY
Start: 2017-05-22

## 2023-05-19 RX ORDER — OMEPRAZOLE 20 MG/1
20 CAPSULE, DELAYED RELEASE ORAL PRN
COMMUNITY

## 2023-05-19 RX ORDER — ASPIRIN 325 MG
325 TABLET ORAL DAILY
COMMUNITY
Start: 2018-12-16

## 2023-05-19 RX ORDER — LEVOTHYROXINE SODIUM 0.05 MG/1
50 TABLET ORAL
COMMUNITY
Start: 2018-12-16

## 2024-03-26 ENCOUNTER — ANESTHESIA (OUTPATIENT)
Facility: HOSPITAL | Age: 59
End: 2024-03-26
Payer: COMMERCIAL

## 2024-03-26 ENCOUNTER — ANESTHESIA EVENT (OUTPATIENT)
Facility: HOSPITAL | Age: 59
End: 2024-03-26
Payer: COMMERCIAL

## 2024-03-26 ENCOUNTER — HOSPITAL ENCOUNTER (OUTPATIENT)
Facility: HOSPITAL | Age: 59
Setting detail: OUTPATIENT SURGERY
Discharge: HOME | End: 2024-03-26
Attending: INTERNAL MEDICINE | Admitting: INTERNAL MEDICINE
Payer: COMMERCIAL

## 2024-03-26 VITALS
OXYGEN SATURATION: 97 % | WEIGHT: 175.71 LBS | BODY MASS INDEX: 26.02 KG/M2 | HEART RATE: 69 BPM | DIASTOLIC BLOOD PRESSURE: 73 MMHG | TEMPERATURE: 98.1 F | SYSTOLIC BLOOD PRESSURE: 116 MMHG | HEIGHT: 69 IN | RESPIRATION RATE: 22 BRPM

## 2024-03-26 PROCEDURE — 6360000002 HC RX W HCPCS: Performed by: NURSE ANESTHETIST, CERTIFIED REGISTERED

## 2024-03-26 PROCEDURE — 3600007512: Performed by: INTERNAL MEDICINE

## 2024-03-26 PROCEDURE — 7100000011 HC PHASE II RECOVERY - ADDTL 15 MIN: Performed by: INTERNAL MEDICINE

## 2024-03-26 PROCEDURE — 7100000010 HC PHASE II RECOVERY - FIRST 15 MIN: Performed by: INTERNAL MEDICINE

## 2024-03-26 PROCEDURE — 3600007502: Performed by: INTERNAL MEDICINE

## 2024-03-26 PROCEDURE — 2580000003 HC RX 258: Performed by: INTERNAL MEDICINE

## 2024-03-26 PROCEDURE — 3700000000 HC ANESTHESIA ATTENDED CARE: Performed by: INTERNAL MEDICINE

## 2024-03-26 PROCEDURE — 3700000001 HC ADD 15 MINUTES (ANESTHESIA): Performed by: INTERNAL MEDICINE

## 2024-03-26 PROCEDURE — 88305 TISSUE EXAM BY PATHOLOGIST: CPT

## 2024-03-26 PROCEDURE — 2500000003 HC RX 250 WO HCPCS: Performed by: NURSE ANESTHETIST, CERTIFIED REGISTERED

## 2024-03-26 RX ORDER — PROPOFOL 10 MG/ML
INJECTION, EMULSION INTRAVENOUS PRN
Status: DISCONTINUED | OUTPATIENT
Start: 2024-03-26 | End: 2024-03-26 | Stop reason: SDUPTHER

## 2024-03-26 RX ORDER — SODIUM CHLORIDE 9 MG/ML
INJECTION, SOLUTION INTRAVENOUS CONTINUOUS
Status: DISCONTINUED | OUTPATIENT
Start: 2024-03-26 | End: 2024-03-26 | Stop reason: HOSPADM

## 2024-03-26 RX ORDER — DEXMEDETOMIDINE HYDROCHLORIDE 100 UG/ML
INJECTION, SOLUTION INTRAVENOUS PRN
Status: DISCONTINUED | OUTPATIENT
Start: 2024-03-26 | End: 2024-03-26 | Stop reason: SDUPTHER

## 2024-03-26 RX ADMIN — PROPOFOL 140 MCG/KG/MIN: 10 INJECTION, EMULSION INTRAVENOUS at 13:38

## 2024-03-26 RX ADMIN — PROPOFOL 40 MG: 10 INJECTION, EMULSION INTRAVENOUS at 13:44

## 2024-03-26 RX ADMIN — SODIUM CHLORIDE: 9 INJECTION, SOLUTION INTRAVENOUS at 13:23

## 2024-03-26 RX ADMIN — PROPOFOL 100 MG: 10 INJECTION, EMULSION INTRAVENOUS at 13:37

## 2024-03-26 RX ADMIN — DEXMEDETOMIDINE 6 MCG: 100 INJECTION, SOLUTION INTRAVENOUS at 13:48

## 2024-03-26 ASSESSMENT — PAIN SCALES - GENERAL
PAINLEVEL_OUTOF10: 0

## 2024-03-26 ASSESSMENT — PAIN - FUNCTIONAL ASSESSMENT: PAIN_FUNCTIONAL_ASSESSMENT: 0-10

## 2024-03-26 NOTE — PROGRESS NOTES
Brandon Lay   1965  [id number]    Situation:  Verbal report received from:   LUCIANA Deng    Procedure: Procedure(s):  COLONOSCOPY  COLONOSCOPY POLYPECTOMY SNARE/COLD BIOPSY    Background:    Preoperative diagnosis: Colon cancer screening [Z12.11]  Postoperative diagnosis: * No post-op diagnosis entered *    :  Dr. Rachel   Assistant(s): Circulator: Ish Mason RN  Circulator Assist: Yocasta Carrizales RN  Endoscopy Technician: Ivelisse Romeo    Specimens:   ID Type Source Tests Collected by Time Destination   1 : cecal polyp Tissue Cecum SURGICAL PATHOLOGY Kingston Rachel MD 3/26/2024 1346    2 : transverse colon polyp Tissue Colon-Transverse SURGICAL PATHOLOGY Kingston Rachel MD 3/26/2024 1353      H. Pylori    no    Assessment:  Intra-procedure medications     Anesthesia gave intra-procedure sedation and medications, see anesthesia flow sheet   yes    Intravenous fluids: NS@ KVO     Vital signs stable   yes    Abdominal assessment: round and soft   yes    Recommendation:  Discharge patient per MD order  yes.  Return to floor  outpatient   Family or Friend   family   Permission to share finding with family or friend   yes

## 2024-03-26 NOTE — ANESTHESIA POSTPROCEDURE EVALUATION
Department of Anesthesiology  Postprocedure Note    Patient: Brandon Lay Jr  MRN: [id number]  YOB: 1965  Date of evaluation: 3/26/2024    Procedure Summary       Date: 03/26/24 Room / Location: [facility] ENDO 03 / [facility] ENDOSCOPY    Anesthesia Start: 1330 Anesthesia Stop: 1359    Procedures:       COLONOSCOPY (Lower GI Region)      COLONOSCOPY POLYPECTOMY SNARE/COLD BIOPSY (Lower GI Region) Diagnosis:       Colon cancer screening      (Colon cancer screening [Z12.11])    Surgeons: Kingston Rachel MD Responsible Provider: Shakir Salcedo MD    Anesthesia Type: MAC ASA Status: 2            Anesthesia Type: No value filed.    Bimal Phase I: Bimal Score: 10    Bimal Phase II: Bimal Score: 10    Anesthesia Post Evaluation    Patient location during evaluation: PACU  Patient participation: complete - patient participated  Level of consciousness: awake  Pain score: 0  Airway patency: patent  Nausea & Vomiting: no nausea and no vomiting  Cardiovascular status: blood pressure returned to baseline  Respiratory status: acceptable  Hydration status: euvolemic  Pain management: adequate    No notable events documented.

## 2024-03-26 NOTE — OP NOTE
[facility]  Kingston Rachel M.D.  [phone]            3/26/2024          Colonoscopy Operative Report  Brandon Lay Jr  :  1965  [facility] Medical Record Number:  [id number]      Indications:    Screening colonoscopy     :  Kingston Rachel MD    Referring Provider: Sania Henriquez MD    Sedation:  MAC anesthesia    Pre-Procedural Exam:      Airway: clear,  No airway problems anticipated  Heart: RRR, without gallops or rubs  Lungs: clear bilaterally without wheezes, crackles, or rhonchi  Abdomen: soft, nontender, nondistended, bowel sounds present  Mental Status: awake, alert and oriented to person, place and time     Procedure Details:  After informed consent was obtained with all risks and benefits of procedure explained and preoperative exam completed, the patient was taken to the endoscopy suite and placed in the left lateral decubitus position.  Upon sequential sedation as per above, a digital rectal exam was performed. The Olympus videocolonoscope  was inserted in the rectum and carefully advanced to the cecum, which was identified by the ileocecal valve and appendiceal orifice.  The quality of preparation was good.  The colonoscope was slowly withdrawn with careful inspection and evaluation between folds. Retroflexion in the rectum was performed.    Findings:   Terminal Ileum: not intubated  Cecum: 1  Sessile polyp(s), the largest 2 mm in size;  Ascending Colon: normal  Transverse Colon: 1  Sessile polyp(s), the largest 3 mm in size;  Descending Colon: no mucosal lesion appreciated  mild diverticulosis;  Sigmoid: no mucosal lesion appreciated  moderate diverticulosis;  Rectum: no mucosal lesion appreciated  Grade 1 internal hemorrhoid(s);    Interventions:  2 complete polypectomy were performed using cold biopsy forceps and cold snare  and the polyps were  retrieved    Specimen Removed:  specimen #1, 2 mm in size, located in the cecum removed by cold biopsy and sent

## 2024-03-26 NOTE — DISCHARGE INSTRUCTIONS
[facility]  Kingston Rachel M.D.  [phone]            COLON DISCHARGE INSTRUCTIONS       3/26/2024    Brandon Lay Jr  :  1965  [facility] Medical Record Number:  [id number]      COLONOSCOPY FINDINGS:  Your colonoscopy showed two diminutive polyps that were removed, diverticulosis in the left colon and small internal hemorrhoids    DISCOMFORT:  Redness at IV site- apply warm compress to area; if redness or soreness persist- contact your physician  There may be a slight amount of blood passed from the rectum  Gaseous discomfort- walking, belching will help relieve any discomfort  You may not operate a vehicle for 12 hours  You may not engage in an occupation involving machinery or appliances for rest of today  You may not drink alcoholic beverages for at least 12 hours  Avoid making any critical decisions for at least 24 hour  DIET:   High fiber diet   - however -  remember your colon is empty and a heavy meal will produce gas.   Avoid these foods:  vegetables, fried / greasy foods, carbonated drinks for today     ACTIVITY:  You may resume your normal daily activities it is recommended that you spend the remainder of the day resting -  avoid any strenuous activity.    CALL M.D.  ANY SIGN OF:   Increasing pain, nausea, vomiting  Abdominal distension (swelling)  New increased bleeding (oral or rectal)  Fever (chills)  Pain in chest area  Bloody discharge from nose or mouth   Shortness of breath    Follow-up Instructions:   Call Dr. Rachel if any questions or problems.   Telephone # [phone]  Biopsy results will be available in  5 to 7 days  Repeat colonoscopy in 5 or 7 years based on pathology results.

## 2024-03-26 NOTE — PROGRESS NOTES
Endoscopy discharge instructions have been reviewed and given to patient.  The patient verbalized understanding and acceptance of instructions.      Dr. Rachel  discussed with patient procedure findings and next steps.

## 2024-03-26 NOTE — ANESTHESIA PRE PROCEDURE
Department of Anesthesiology  Preprocedure Note       Name:  Brandon Lay Jr   Age:  58 y.o.  :  1965                                          MRN:  [id number]         Date:  3/26/2024      Surgeon: Surgeon(s):  Kingston Rachel MD    Procedure: Procedure(s):  COLONOSCOPY    Medications prior to admission:   Prior to Admission medications    Medication Sig Start Date End Date Taking? Authorizing Provider   aspirin 325 MG tablet Take 1 tablet by mouth daily  Patient not taking: Reported on 3/26/2024 12/16/18   Automatic Reconciliation, Ar   HYDROcodone-acetaminophen (NORCO) 5-325 MG per tablet Take 1 tablet by mouth every 4 hours as needed. Max Daily Amount: 6 tablets 17   Automatic Reconciliation, Ar   levothyroxine (SYNTHROID) 50 MCG tablet Take 1 tablet by mouth every morning (before breakfast)  Patient not taking: Reported on 3/26/2024 12/16/18   Automatic Reconciliation, Ar   omeprazole (PRILOSEC) 20 MG delayed release capsule Take 1 capsule by mouth as needed    Automatic Reconciliation, Ar       Current medications:    No current facility-administered medications for this encounter.       Allergies:  No Known Allergies    Problem List:    Patient Active Problem List   Diagnosis Code   • Left-sided weakness R53.1   • Cervical spine fracture (HCC) S12.9XXA       Past Medical History:        Diagnosis Date   • GERD (gastroesophageal reflux disease)    • H/O cervical fracture    • History of shingles        Past Surgical History:        Procedure Laterality Date   • KNEE ARTHROSCOPY Left ,    2nd scope became open surgery   • KNEE ARTHROSCOPY Right     age 17   • ORTHOPEDIC SURGERY  2015    ACDF C6-7 after a fall   • [place] UNLISTED PROCEDURE ABDOMEN PERITONEUM & OMENTUM Right     IHR age 26   • VASECTOMY         Social History:    Social History     Tobacco Use   • Smoking status: Former     Current packs/day: 0.00     Types: Cigarettes     Quit date: 2016     Years since quittin.2   •

## 2024-03-26 NOTE — PROGRESS NOTES

## 2024-03-27 NOTE — H&P
[facility]  [place] Rachel M.D.  [phone]    History and Physical       NAME:  Brandon Lay Jr   :   1965   MRN:   [id number]         Consult Date: 3/27/2024 9:25 AM    Chief Complaint:  Colon cancer screening    History of Present Illness:  Patient is a 58 y.o. who is seen for colon cancer screening. Denies any ongoing GI complaints.      PMH:  Past Medical History:   Diagnosis Date    GERD (gastroesophageal reflux disease)     H/O cervical fracture     History of shingles        PSH:  Past Surgical History:   Procedure Laterality Date    KNEE ARTHROSCOPY Left ,    2nd scope became open surgery    KNEE ARTHROSCOPY Right     age 17    ORTHOPEDIC SURGERY  2015    ACDF C6-7 after a fall    IN UNLISTED PROCEDURE ABDOMEN PERITONEUM & OMENTUM Right     IHR age 26    VASECTOMY         Allergies:  No Known Allergies    Home Medications:  Prior to Admission Medications   Prescriptions Last Dose Informant Patient Reported? Taking?   HYDROcodone-acetaminophen (NORCO) 5-325 MG per tablet   Yes No   Sig: Take 1 tablet by mouth every 4 hours as needed. Max Daily Amount: 6 tablets   aspirin 325 MG tablet Not Taking  Yes No   Sig: Take 1 tablet by mouth daily   Patient not taking: Reported on 3/26/2024   levothyroxine (SYNTHROID) 50 MCG tablet Not Taking  Yes No   Sig: Take 1 tablet by mouth every morning (before breakfast)   Patient not taking: Reported on 3/26/2024   omeprazole (PRILOSEC) 20 MG delayed release capsule Not Taking  Yes No   Sig: Take 1 capsule by mouth as needed   Patient not taking: Reported on 3/26/2024      Facility-Administered Medications: None       Hospital Medications:  No current facility-administered medications for this encounter.     Current Outpatient Medications   Medication Sig    aspirin 325 MG tablet Take 1 tablet by mouth daily (Patient not taking: Reported on 3/26/2024)    HYDROcodone-acetaminophen (NORCO) 5-325 MG per tablet Take 1 tablet by mouth

## (undated) DEVICE — ZIMMER® STERILE DISPOSABLE TOURNIQUET CUFF WITH PROTECTIVE SLEEVE AND PLC, DUAL PORT, SINGLE BLADDER, 34 IN. (86 CM)

## (undated) DEVICE — STERILE POLYISOPRENE POWDER-FREE SURGICAL GLOVES: Brand: PROTEXIS

## (undated) DEVICE — MEDI-VAC NON-CONDUCTIVE SUCTION TUBING: Brand: CARDINAL HEALTH

## (undated) DEVICE — 3M™ STERI-DRAPE™ U-DRAPE 1015: Brand: STERI-DRAPE™

## (undated) DEVICE — GAUZE SPONGES,12 PLY: Brand: CURITY

## (undated) DEVICE — 4-PORT MANIFOLD: Brand: NEPTUNE 2

## (undated) DEVICE — BOOT ORTH XL KNEE GRN TYVEK HI CVR SKID RESIST HEAT SEAL E

## (undated) DEVICE — INFECTION CONTROL KIT SYS

## (undated) DEVICE — (D)PREP SKN CHLRAPRP APPL 26ML -- CONVERT TO ITEM 371833

## (undated) DEVICE — SUT ETHLN 3-0 18IN PS2 BLK --

## (undated) DEVICE — CURITY NON-ADHERENT STRIPS: Brand: CURITY

## (undated) DEVICE — COVER LT HNDL BLU PLAS

## (undated) DEVICE — STERILE POLYISOPRENE POWDER-FREE SURGICAL GLOVES WITH EMOLLIENT COATING: Brand: PROTEXIS

## (undated) DEVICE — DRAPE,ARTHRO,W/POUCH,STERILE: Brand: MEDLINE

## (undated) DEVICE — GOWN,BREATHABLE,IMP SLV 3XL AURORA: Brand: MEDLINE

## (undated) DEVICE — DEVON™ KNEE AND BODY STRAP 60" X 3" (1.5 M X 7.6 CM): Brand: DEVON

## (undated) DEVICE — DRAPE,REIN 53X77,STERILE: Brand: MEDLINE

## (undated) DEVICE — 3.5 MM FULL RADIUS STRAIGHT                                    BLADES, POWER/EP-1, BEIGE, PACKAGED                                    6 PER BOX, STERILE

## (undated) DEVICE — SOLUTION IRRIG 3000ML LAC R FLX CONT

## (undated) DEVICE — TUBING PMP IRRIG GOFLO

## (undated) DEVICE — ARTHROSCOPY RICHMOND-LF: Brand: MEDLINE INDUSTRIES, INC.